# Patient Record
Sex: MALE | Race: WHITE | NOT HISPANIC OR LATINO | Employment: FULL TIME | URBAN - NONMETROPOLITAN AREA
[De-identification: names, ages, dates, MRNs, and addresses within clinical notes are randomized per-mention and may not be internally consistent; named-entity substitution may affect disease eponyms.]

---

## 2023-03-24 DIAGNOSIS — E78.2 MIXED HYPERLIPIDEMIA: ICD-10-CM

## 2023-03-24 RX ORDER — PRAVASTATIN SODIUM 40 MG/1
40 TABLET ORAL NIGHTLY
Qty: 90 TABLET | Refills: 1 | Status: SHIPPED | OUTPATIENT
Start: 2023-03-24 | End: 2023-06-05 | Stop reason: SDUPTHER

## 2023-03-24 RX ORDER — PRAVASTATIN SODIUM 40 MG/1
40 TABLET ORAL NIGHTLY
COMMUNITY
End: 2023-03-24 | Stop reason: SDUPTHER

## 2023-06-04 PROBLEM — R06.02 SHORTNESS OF BREATH ON EXERTION: Status: ACTIVE | Noted: 2023-06-04

## 2023-06-04 PROBLEM — R06.02 SHORTNESS OF BREATH ON EXERTION: Status: RESOLVED | Noted: 2023-06-04 | Resolved: 2023-06-04

## 2023-06-04 PROBLEM — E66.9 OBESITY: Status: ACTIVE | Noted: 2023-06-04

## 2023-06-04 PROBLEM — E11.9 DIABETES MELLITUS, TYPE 2 (MULTI): Status: ACTIVE | Noted: 2023-06-04

## 2023-06-04 PROBLEM — K21.9 GERD (GASTROESOPHAGEAL REFLUX DISEASE): Status: ACTIVE | Noted: 2023-06-04

## 2023-06-04 PROBLEM — I10 BENIGN ESSENTIAL HYPERTENSION: Status: ACTIVE | Noted: 2023-06-04

## 2023-06-04 PROBLEM — K76.0 FATTY LIVER: Status: ACTIVE | Noted: 2023-06-04

## 2023-06-04 NOTE — PROGRESS NOTES
Subjective   Patient ID: Mikie Louie is a 58 y.o. male who presents for Diabetes, Hypertension, and Hyperlipidemia.    HPI     The patient presents for a diabetes visit.   Last A1c: 7.0 in Dec   Medication regimen includes: farxiga, metformin, trulicity   Have you seen your eye doctor this year? Dr Piper- April 2023   Do you see a podiatrist (foot doc)? no  Peripheral neuropathy: no   Diet: pretty good   Exercise: some - needs to get back into it   Tobacco use: no  Denies chest pain, palpitations, dyspnea, edema, vision changes, confusion, weakness, polyphagia, polydipsia, polyuria, nausea, vomiting, abdominal pain, paresthesias, changes in urination     HTN- lisinopril- BP controlled      HPL- pravastatin     Hx SVT - following with cardiology- no prior ablation- was supposed to get but had to get postponed due to covid pandemic and needs to reschedule -   He then had an episode of A fib and was in ER 6 months ago- follow up with cardiology- Dr. Rodriges - reports is going to get a loop recorder placed when they do the ablation   Discussed anticoagulation with him but he wanted to hold off to see if it happens again   Now on metoprolol which seems to be suppressing the SVT     GERD- on pantoprazole 40 mg daily, pepcid now too and tums   EGD 2022  GI- Dr. Varela     Hearing trouble, getting wax out, thinks blocked       Review of Systems   Constitutional:  Negative for chills, fatigue and fever.   HENT:  Negative for congestion, ear pain and sore throat.    Eyes:  Negative for visual disturbance.   Respiratory:  Negative for cough and shortness of breath.    Cardiovascular:  Negative for chest pain, palpitations and leg swelling.   Gastrointestinal:  Negative for abdominal pain, constipation, diarrhea, nausea and vomiting.   Genitourinary: Negative.    Musculoskeletal: Negative.    Skin:  Negative for rash.   Neurological:  Negative for dizziness, weakness, numbness and headaches.   Psychiatric/Behavioral:  "Negative.         Objective   /70 (BP Location: Right arm, Patient Position: Sitting, BP Cuff Size: Large adult)   Pulse 65   Temp 35.9 °C (96.6 °F) (Temporal)   Resp 16   Ht 1.721 m (5' 7.75\")   Wt 98.7 kg (217 lb 9.6 oz)   SpO2 96%   BMI 33.33 kg/m²     Physical Exam  Constitutional: Well developed, well nourished, alert and in no acute distress.  Head and Face: Normocephalic, atraumatic.  Eyes: Normal external exam. Pupils equally round and reactive to light with normal accommodation and extraocular movements intact.   ENT: External inspection of ears normal, tympanic membranes blocked by cerumen bilaterally.  Neck: Supple, no lymphadenopathy or masses. Thyroid not enlarged, no palpable nodules.   Cardiovascular: Regular rate and rhythm, normal S1 and S2, no murmurs, gallops, or rubs. Radial pulses normal. No peripheral edema. No carotid bruits.   Pulmonary: No respiratory distress, lungs clear to auscultation bilaterally. No wheezes, rhonchi, rales.  Musculoskeletal: Gait normal. Muscle strength/tone normal. Normal range of motion of all extremities.   Skin: Warm, well perfused, normal skin turgor and color, no lesions or rashes noted.   Neurologic: Cranial nerves II-XII grossly intact.   Psychiatric: Mood calm and affect normal.      Assessment/Plan   Problem List Items Addressed This Visit          Circulatory    Benign essential hypertension     Controlled, continue lisinopril          Relevant Medications    lisinopril 5 mg tablet    Other Relevant Orders    Basic Metabolic Panel    CBC and Auto Differential    SVT (supraventricular tachycardia) (CMS/HCC)     Continue metoprolol- follows with cardiology          Relevant Medications    metoprolol tartrate (Lopressor) 25 mg tablet    Paroxysmal atrial fibrillation (CMS/HCC)     Continue metoprolol, follows with cardiology          Relevant Medications    metoprolol tartrate (Lopressor) 25 mg tablet       Digestive    GERD (gastroesophageal reflux " disease) - Primary     Continue PPI, pepcid - Dr. Varela          Relevant Medications    pantoprazole (ProtoNix) 40 mg EC tablet    famotidine (Pepcid) 20 mg tablet       Endocrine/Metabolic    Diabetes mellitus, type 2 (CMS/HCC)     A1c 6.9- controlled-  continue farxiga, metformin, trulicity          Relevant Medications    dulaglutide (Trulicity) 1.5 mg/0.5 mL pen injector    metFORMIN (Glucophage) 500 mg tablet    dapagliflozin (Farxiga) 10 mg    Other Relevant Orders    POCT glycosylated hemoglobin (Hb A1C) manually resulted (Completed)    Class 1 obesity without serious comorbidity with body mass index (BMI) of 33.0 to 33.9 in adult       Other    Mixed hyperlipidemia     Recheck fasting, continue pravastatin          Relevant Medications    pravastatin (Pravachol) 40 mg tablet    Other Relevant Orders    Lipid Panel     Other Visit Diagnoses       Bilateral impacted cerumen        Relevant Orders    Ear cerumen removal    Hearing problem of both ears        Seasonal allergic rhinitis, unspecified trigger        Relevant Medications    loratadine (Claritin) 10 mg tablet    fluticasone (Flonase) 50 mcg/actuation nasal spray          I was unable to completely remove the cerumen with use of magnification provided by an otoscope and an ear curette.  Cerumen irrigation was performed in the office with water and peroxide with success by the medical assistant.    The patient tolerated the procedure well.  Repeat examination shows that the tympanic membranes are normal without signs of infection.    Angeles Clarke, DO  6/5/2023

## 2023-06-05 ENCOUNTER — OFFICE VISIT (OUTPATIENT)
Dept: PRIMARY CARE | Facility: CLINIC | Age: 58
End: 2023-06-05
Payer: COMMERCIAL

## 2023-06-05 ENCOUNTER — LAB (OUTPATIENT)
Dept: LAB | Facility: LAB | Age: 58
End: 2023-06-05
Payer: COMMERCIAL

## 2023-06-05 VITALS
TEMPERATURE: 96.6 F | WEIGHT: 217.6 LBS | DIASTOLIC BLOOD PRESSURE: 70 MMHG | HEIGHT: 68 IN | HEART RATE: 65 BPM | OXYGEN SATURATION: 96 % | RESPIRATION RATE: 16 BRPM | BODY MASS INDEX: 32.98 KG/M2 | SYSTOLIC BLOOD PRESSURE: 108 MMHG

## 2023-06-05 DIAGNOSIS — I10 BENIGN ESSENTIAL HYPERTENSION: ICD-10-CM

## 2023-06-05 DIAGNOSIS — K21.9 GASTROESOPHAGEAL REFLUX DISEASE, UNSPECIFIED WHETHER ESOPHAGITIS PRESENT: ICD-10-CM

## 2023-06-05 DIAGNOSIS — I47.10 SVT (SUPRAVENTRICULAR TACHYCARDIA) (CMS-HCC): ICD-10-CM

## 2023-06-05 DIAGNOSIS — I48.0 PAROXYSMAL ATRIAL FIBRILLATION (MULTI): ICD-10-CM

## 2023-06-05 DIAGNOSIS — I10 BENIGN ESSENTIAL HYPERTENSION: Primary | ICD-10-CM

## 2023-06-05 DIAGNOSIS — H91.93 HEARING PROBLEM OF BOTH EARS: ICD-10-CM

## 2023-06-05 DIAGNOSIS — E78.2 MIXED HYPERLIPIDEMIA: ICD-10-CM

## 2023-06-05 DIAGNOSIS — E11.9 TYPE 2 DIABETES MELLITUS WITHOUT COMPLICATION, WITHOUT LONG-TERM CURRENT USE OF INSULIN (MULTI): ICD-10-CM

## 2023-06-05 DIAGNOSIS — H61.23 BILATERAL IMPACTED CERUMEN: ICD-10-CM

## 2023-06-05 DIAGNOSIS — E66.9 CLASS 1 OBESITY WITHOUT SERIOUS COMORBIDITY WITH BODY MASS INDEX (BMI) OF 33.0 TO 33.9 IN ADULT, UNSPECIFIED OBESITY TYPE: ICD-10-CM

## 2023-06-05 DIAGNOSIS — J30.2 SEASONAL ALLERGIC RHINITIS, UNSPECIFIED TRIGGER: ICD-10-CM

## 2023-06-05 PROBLEM — E66.813 CLASS 3 SEVERE OBESITY WITHOUT SERIOUS COMORBIDITY WITH BODY MASS INDEX (BMI) OF 40.0 TO 44.9 IN ADULT: Status: ACTIVE | Noted: 2023-06-04

## 2023-06-05 PROBLEM — E66.811 CLASS 1 OBESITY WITHOUT SERIOUS COMORBIDITY WITH BODY MASS INDEX (BMI) OF 33.0 TO 33.9 IN ADULT: Status: ACTIVE | Noted: 2023-06-04

## 2023-06-05 PROBLEM — E66.01 CLASS 3 SEVERE OBESITY WITHOUT SERIOUS COMORBIDITY WITH BODY MASS INDEX (BMI) OF 40.0 TO 44.9 IN ADULT (MULTI): Status: ACTIVE | Noted: 2023-06-04

## 2023-06-05 LAB
ANION GAP IN SER/PLAS: 13 MMOL/L (ref 10–20)
BASOPHILS (10*3/UL) IN BLOOD BY AUTOMATED COUNT: 0.04 X10E9/L (ref 0–0.1)
BASOPHILS/100 LEUKOCYTES IN BLOOD BY AUTOMATED COUNT: 0.6 % (ref 0–2)
CALCIUM (MG/DL) IN SER/PLAS: 10.6 MG/DL (ref 8.6–10.6)
CARBON DIOXIDE, TOTAL (MMOL/L) IN SER/PLAS: 29 MMOL/L (ref 21–32)
CHLORIDE (MMOL/L) IN SER/PLAS: 99 MMOL/L (ref 98–107)
CHOLESTEROL (MG/DL) IN SER/PLAS: 126 MG/DL (ref 0–199)
CHOLESTEROL IN HDL (MG/DL) IN SER/PLAS: 36 MG/DL
CHOLESTEROL/HDL RATIO: 3.5
CREATININE (MG/DL) IN SER/PLAS: 1.21 MG/DL (ref 0.5–1.3)
EOSINOPHILS (10*3/UL) IN BLOOD BY AUTOMATED COUNT: 0.2 X10E9/L (ref 0–0.7)
EOSINOPHILS/100 LEUKOCYTES IN BLOOD BY AUTOMATED COUNT: 2.8 % (ref 0–6)
ERYTHROCYTE DISTRIBUTION WIDTH (RATIO) BY AUTOMATED COUNT: 13.1 % (ref 11.5–14.5)
ERYTHROCYTE MEAN CORPUSCULAR HEMOGLOBIN CONCENTRATION (G/DL) BY AUTOMATED: 33.6 G/DL (ref 32–36)
ERYTHROCYTE MEAN CORPUSCULAR VOLUME (FL) BY AUTOMATED COUNT: 91 FL (ref 80–100)
ERYTHROCYTES (10*6/UL) IN BLOOD BY AUTOMATED COUNT: 5.45 X10E12/L (ref 4.5–5.9)
GFR MALE: 70 ML/MIN/1.73M2
GLUCOSE (MG/DL) IN SER/PLAS: 151 MG/DL (ref 74–99)
HEMATOCRIT (%) IN BLOOD BY AUTOMATED COUNT: 49.4 % (ref 41–52)
HEMOGLOBIN (G/DL) IN BLOOD: 16.6 G/DL (ref 13.5–17.5)
IMMATURE GRANULOCYTES/100 LEUKOCYTES IN BLOOD BY AUTOMATED COUNT: 0.3 % (ref 0–0.9)
LDL: 46 MG/DL (ref 0–99)
LEUKOCYTES (10*3/UL) IN BLOOD BY AUTOMATED COUNT: 7.2 X10E9/L (ref 4.4–11.3)
LYMPHOCYTES (10*3/UL) IN BLOOD BY AUTOMATED COUNT: 1.84 X10E9/L (ref 1.2–4.8)
LYMPHOCYTES/100 LEUKOCYTES IN BLOOD BY AUTOMATED COUNT: 25.6 % (ref 13–44)
MONOCYTES (10*3/UL) IN BLOOD BY AUTOMATED COUNT: 0.59 X10E9/L (ref 0.1–1)
MONOCYTES/100 LEUKOCYTES IN BLOOD BY AUTOMATED COUNT: 8.2 % (ref 2–10)
NEUTROPHILS (10*3/UL) IN BLOOD BY AUTOMATED COUNT: 4.49 X10E9/L (ref 1.2–7.7)
NEUTROPHILS/100 LEUKOCYTES IN BLOOD BY AUTOMATED COUNT: 62.5 % (ref 40–80)
NON HDL CHOLESTEROL: 90 MG/DL
NRBC (PER 100 WBCS) BY AUTOMATED COUNT: 0 /100 WBC (ref 0–0)
PLATELETS (10*3/UL) IN BLOOD AUTOMATED COUNT: 180 X10E9/L (ref 150–450)
POC HEMOGLOBIN A1C: 6.9 % (ref 4.2–6.5)
POTASSIUM (MMOL/L) IN SER/PLAS: 4.2 MMOL/L (ref 3.5–5.3)
SODIUM (MMOL/L) IN SER/PLAS: 137 MMOL/L (ref 136–145)
TRIGLYCERIDE (MG/DL) IN SER/PLAS: 222 MG/DL (ref 0–149)
UREA NITROGEN (MG/DL) IN SER/PLAS: 20 MG/DL (ref 6–23)
VLDL: 44 MG/DL (ref 0–40)

## 2023-06-05 PROCEDURE — 3008F BODY MASS INDEX DOCD: CPT | Performed by: FAMILY MEDICINE

## 2023-06-05 PROCEDURE — 99214 OFFICE O/P EST MOD 30 MIN: CPT | Performed by: FAMILY MEDICINE

## 2023-06-05 PROCEDURE — 80061 LIPID PANEL: CPT

## 2023-06-05 PROCEDURE — 3078F DIAST BP <80 MM HG: CPT | Performed by: FAMILY MEDICINE

## 2023-06-05 PROCEDURE — 36415 COLL VENOUS BLD VENIPUNCTURE: CPT

## 2023-06-05 PROCEDURE — 3074F SYST BP LT 130 MM HG: CPT | Performed by: FAMILY MEDICINE

## 2023-06-05 PROCEDURE — 69209 REMOVE IMPACTED EAR WAX UNI: CPT | Performed by: FAMILY MEDICINE

## 2023-06-05 PROCEDURE — 80048 BASIC METABOLIC PNL TOTAL CA: CPT

## 2023-06-05 PROCEDURE — 4010F ACE/ARB THERAPY RXD/TAKEN: CPT | Performed by: FAMILY MEDICINE

## 2023-06-05 PROCEDURE — 83036 HEMOGLOBIN GLYCOSYLATED A1C: CPT | Performed by: FAMILY MEDICINE

## 2023-06-05 PROCEDURE — 85025 COMPLETE CBC W/AUTO DIFF WBC: CPT

## 2023-06-05 PROCEDURE — 1036F TOBACCO NON-USER: CPT | Performed by: FAMILY MEDICINE

## 2023-06-05 RX ORDER — LISINOPRIL 5 MG/1
5 TABLET ORAL DAILY
COMMUNITY
End: 2023-06-05 | Stop reason: SDUPTHER

## 2023-06-05 RX ORDER — LORATADINE 10 MG/1
10 TABLET ORAL DAILY
Qty: 90 TABLET | Refills: 3 | Status: SHIPPED | OUTPATIENT
Start: 2023-06-05 | End: 2024-06-04

## 2023-06-05 RX ORDER — DAPAGLIFLOZIN 10 MG/1
10 TABLET, FILM COATED ORAL
Qty: 90 TABLET | Refills: 3 | Status: SHIPPED | OUTPATIENT
Start: 2023-06-05 | End: 2024-04-17 | Stop reason: SDUPTHER

## 2023-06-05 RX ORDER — METOPROLOL TARTRATE 25 MG/1
25 TABLET, FILM COATED ORAL 2 TIMES DAILY
Qty: 180 TABLET | Refills: 3 | Status: CANCELLED | OUTPATIENT
Start: 2023-06-05 | End: 2024-06-04

## 2023-06-05 RX ORDER — PANTOPRAZOLE SODIUM 20 MG/1
1 TABLET, DELAYED RELEASE ORAL DAILY
COMMUNITY
Start: 2015-07-10 | End: 2023-06-05 | Stop reason: DRUGHIGH

## 2023-06-05 RX ORDER — METFORMIN HYDROCHLORIDE 500 MG/1
1000 TABLET ORAL EVERY 12 HOURS
COMMUNITY
Start: 2015-09-28 | End: 2023-06-05 | Stop reason: SDUPTHER

## 2023-06-05 RX ORDER — METOPROLOL TARTRATE 25 MG/1
25 TABLET, FILM COATED ORAL 2 TIMES DAILY
COMMUNITY
End: 2023-07-31

## 2023-06-05 RX ORDER — DULAGLUTIDE 1.5 MG/.5ML
INJECTION, SOLUTION SUBCUTANEOUS
COMMUNITY
End: 2023-06-05 | Stop reason: SDUPTHER

## 2023-06-05 RX ORDER — LORATADINE 10 MG/1
1 TABLET ORAL DAILY
COMMUNITY
Start: 2020-06-04 | End: 2023-06-05 | Stop reason: SDUPTHER

## 2023-06-05 RX ORDER — METFORMIN HYDROCHLORIDE 500 MG/1
1000 TABLET ORAL EVERY 12 HOURS
Qty: 360 TABLET | Refills: 3 | Status: SHIPPED | OUTPATIENT
Start: 2023-06-05 | End: 2024-05-17

## 2023-06-05 RX ORDER — CHOLECALCIFEROL (VITAMIN D3) 125 MCG
5000 CAPSULE ORAL
COMMUNITY

## 2023-06-05 RX ORDER — DAPAGLIFLOZIN 10 MG/1
1 TABLET, FILM COATED ORAL
COMMUNITY
Start: 2019-10-31 | End: 2023-06-05 | Stop reason: SDUPTHER

## 2023-06-05 RX ORDER — DULAGLUTIDE 1.5 MG/.5ML
INJECTION, SOLUTION SUBCUTANEOUS
Qty: 6 ML | Refills: 3 | Status: SHIPPED | OUTPATIENT
Start: 2023-06-05 | End: 2023-10-06 | Stop reason: SDUPTHER

## 2023-06-05 RX ORDER — PRAVASTATIN SODIUM 40 MG/1
40 TABLET ORAL NIGHTLY
Qty: 90 TABLET | Refills: 3 | Status: SHIPPED | OUTPATIENT
Start: 2023-06-05 | End: 2024-06-10

## 2023-06-05 RX ORDER — LISINOPRIL 5 MG/1
5 TABLET ORAL DAILY
Qty: 90 TABLET | Refills: 3 | Status: SHIPPED | OUTPATIENT
Start: 2023-06-05 | End: 2024-05-17

## 2023-06-05 RX ORDER — FLUTICASONE PROPIONATE 50 MCG
2 SPRAY, SUSPENSION (ML) NASAL
Qty: 16 G | Refills: 3 | Status: SHIPPED | OUTPATIENT
Start: 2023-06-05 | End: 2024-06-04

## 2023-06-05 RX ORDER — PANTOPRAZOLE SODIUM 40 MG/1
40 TABLET, DELAYED RELEASE ORAL DAILY
Qty: 90 TABLET | Refills: 3 | Status: SHIPPED | OUTPATIENT
Start: 2023-06-05 | End: 2024-05-17

## 2023-06-05 RX ORDER — PANTOPRAZOLE SODIUM 20 MG/1
40 TABLET, DELAYED RELEASE ORAL DAILY
Qty: 180 TABLET | Refills: 3 | Status: SHIPPED | OUTPATIENT
Start: 2023-06-05 | End: 2023-06-05 | Stop reason: SDUPTHER

## 2023-06-05 RX ORDER — BLOOD-GLUCOSE METER
EACH MISCELLANEOUS
COMMUNITY
Start: 2020-08-24

## 2023-06-05 RX ORDER — FLUTICASONE PROPIONATE 50 MCG
2 SPRAY, SUSPENSION (ML) NASAL
COMMUNITY
Start: 2021-08-01 | End: 2023-06-05 | Stop reason: SDUPTHER

## 2023-06-05 RX ORDER — ASPIRIN 81 MG/1
TABLET ORAL EVERY 24 HOURS
COMMUNITY

## 2023-06-05 RX ORDER — FAMOTIDINE 20 MG/1
20 TABLET, FILM COATED ORAL
Qty: 90 TABLET | Refills: 3 | Status: SHIPPED | OUTPATIENT
Start: 2023-06-05 | End: 2024-05-17

## 2023-06-05 RX ORDER — FAMOTIDINE 20 MG/1
20 TABLET, FILM COATED ORAL
COMMUNITY
Start: 2022-06-06 | End: 2023-06-05 | Stop reason: SDUPTHER

## 2023-06-05 ASSESSMENT — ENCOUNTER SYMPTOMS
WEAKNESS: 0
DIARRHEA: 0
MUSCULOSKELETAL NEGATIVE: 1
DIZZINESS: 0
SHORTNESS OF BREATH: 0
NAUSEA: 0
SORE THROAT: 0
CHILLS: 0
FEVER: 0
NUMBNESS: 0
PALPITATIONS: 0
COUGH: 0
HEADACHES: 0
PSYCHIATRIC NEGATIVE: 1
ABDOMINAL PAIN: 0
FATIGUE: 0
CONSTIPATION: 0
VOMITING: 0

## 2023-06-05 ASSESSMENT — PATIENT HEALTH QUESTIONNAIRE - PHQ9
1. LITTLE INTEREST OR PLEASURE IN DOING THINGS: NOT AT ALL
2. FEELING DOWN, DEPRESSED OR HOPELESS: NOT AT ALL
SUM OF ALL RESPONSES TO PHQ9 QUESTIONS 1 AND 2: 0

## 2023-06-05 NOTE — PROGRESS NOTES
Patient ID: Mikie Louie is a 58 y.o. male.    Ear Cerumen Removal    Date/Time: 6/5/2023 9:26 AM    Performed by: Shaneka Aviles CMA  Authorized by: Angeles Clarke DO    Consent:     Consent obtained:  Verbal    Consent given by:  Patient  Universal protocol:     Patient identity confirmed:  Verbally with patient  Procedure details:     Location:  L ear and R ear    Procedure type: irrigation      Procedure outcomes: cerumen removed    Post-procedure details:     Inspection:  Ear canal clear    Procedure completion:  Tolerated

## 2023-06-07 DIAGNOSIS — E78.1 HYPERTRIGLYCERIDEMIA: Primary | ICD-10-CM

## 2023-06-07 RX ORDER — ICOSAPENT ETHYL 1 G/1
2 CAPSULE ORAL
Qty: 360 CAPSULE | Refills: 3 | Status: SHIPPED | OUTPATIENT
Start: 2023-06-07 | End: 2024-05-17

## 2023-07-29 DIAGNOSIS — I10 BENIGN ESSENTIAL HYPERTENSION: Primary | ICD-10-CM

## 2023-07-31 RX ORDER — METOPROLOL TARTRATE 25 MG/1
25 TABLET, FILM COATED ORAL 2 TIMES DAILY
Qty: 180 TABLET | Refills: 0 | Status: SHIPPED | OUTPATIENT
Start: 2023-07-31 | End: 2023-11-13

## 2023-08-10 DIAGNOSIS — E11.9 TYPE 2 DIABETES MELLITUS WITHOUT COMPLICATION, WITHOUT LONG-TERM CURRENT USE OF INSULIN (MULTI): Primary | ICD-10-CM

## 2023-08-18 ENCOUNTER — TELEMEDICINE (OUTPATIENT)
Dept: PHARMACY | Facility: HOSPITAL | Age: 58
End: 2023-08-18
Payer: COMMERCIAL

## 2023-08-18 DIAGNOSIS — E11.9 TYPE 2 DIABETES MELLITUS WITHOUT COMPLICATION, WITHOUT LONG-TERM CURRENT USE OF INSULIN (MULTI): ICD-10-CM

## 2023-08-18 NOTE — ASSESSMENT & PLAN NOTE
Patient controlled with most recent A1C of 6.9% (goal <7%).  Patient currently taking Metformin 1,00 mg BID, Farxiga 10 mg once daily, and Trulicity 1.5 mg once weekly.  Patient is tolerating medication well but did state he feels nauseous at times but believes that it is because of the foods he eats.      PLAN:   - Continue current medications (Metformin, Farxiga, Trulicity) unchanged.    - Patient stated that he does not need refills on any medications at this time     Follow up: as needed per patient request

## 2023-08-18 NOTE — PROGRESS NOTES
Subjective   Patient ID: Mikie Louie is a 58 y.o. male who presents for Diabetes.    Referring Provider: Angeles Clarke DO     Diabetes  He presents for his follow-up diabetic visit. He has type 2 diabetes mellitus. His disease course has been stable. There are no hypoglycemic associated symptoms. There are no hypoglycemic complications. He is compliant with treatment all of the time. There is no change in his home blood glucose trend. An ACE inhibitor/angiotensin II receptor blocker is being taken.       Review of Systems    Objective     There were no vitals taken for this visit.     Labs  Lab Results   Component Value Date    BILITOT 0.9 12/12/2022    CALCIUM 10.6 06/05/2023    CO2 29 06/05/2023    CL 99 06/05/2023    CREATININE 1.21 06/05/2023    GLUCOSE 151 (H) 06/05/2023    ALKPHOS 49 12/12/2022    K 4.2 06/05/2023    PROT 7.6 12/12/2022     06/05/2023    AST 26 12/12/2022    ALT 46 12/12/2022    BUN 20 06/05/2023    ANIONGAP 13 06/05/2023    ALBUMIN 4.3 12/12/2022    GFRMALE 70 06/05/2023     Lab Results   Component Value Date    TRIG 222 (H) 06/05/2023    CHOL 126 06/05/2023    HDL 36.0 (A) 06/05/2023     Lab Results   Component Value Date    HGBA1C 6.9 (A) 06/05/2023       Current Outpatient Medications on File Prior to Visit   Medication Sig Dispense Refill    aspirin 81 mg EC tablet once every 24 hours.      blood sugar diagnostic (OneTouch Verio test strips) strip TEST ONCE DAILY      cholecalciferol (Vitamin D-3) 125 MCG (5000 UT) capsule Take 1 capsule (5,000 Units) by mouth once daily.      dapagliflozin (Farxiga) 10 mg Take 1 tablet (10 mg) by mouth once daily in the morning. Take before meals. 90 tablet 3    dulaglutide (Trulicity) 1.5 mg/0.5 mL pen injector INJECT 1 PEN UNDER THE SKIN ONCE A WEEK 6 mL 3    famotidine (Pepcid) 20 mg tablet Take 1 tablet (20 mg) by mouth once daily. 90 tablet 3    fluticasone (Flonase) 50 mcg/actuation nasal spray Administer 2 sprays into each nostril  once daily. 16 g 3    icosapent ethyL (Vascepa) 1 gram capsule Take 2 capsules (2 g) by mouth 2 times a day with meals. 360 capsule 3    lisinopril 5 mg tablet Take 1 tablet (5 mg) by mouth once daily. 90 tablet 3    loratadine (Claritin) 10 mg tablet Take 1 tablet (10 mg) by mouth once daily. 90 tablet 3    metFORMIN (Glucophage) 500 mg tablet Take 2 tablets (1,000 mg) by mouth every 12 hours. 360 tablet 3    metoprolol tartrate (Lopressor) 25 mg tablet TAKE ONE TABLET BY MOUTH TWO TIMES A  tablet 0    pantoprazole (ProtoNix) 40 mg EC tablet Take 1 tablet (40 mg) by mouth once daily. 90 tablet 3    pravastatin (Pravachol) 40 mg tablet Take 1 tablet (40 mg) by mouth once daily at bedtime. 90 tablet 3     No current facility-administered medications on file prior to visit.        Assessment/Plan   Problem List Items Addressed This Visit       Diabetes mellitus, type 2 (CMS/[facility])     Patient controlled with most recent A1C of 6.9% (goal <7%).  Patient currently taking Metformin 1,00 mg BID, Farxiga 10 mg once daily, and Trulicity 1.5 mg once weekly.  Patient is tolerating medication well but did state he feels nauseous at times but believes that it is because of the foods he eats.      PLAN:   - Continue current medications (Metformin, Farxiga, Trulicity) unchanged.    - Patient stated that he does not need refills on any medications at this time     Follow up: as needed per patient request             Bessie Escoto, PharmD    Continue all meds under the continuation of care with the referring provider and clinical pharmacy team.

## 2023-10-06 DIAGNOSIS — E11.9 TYPE 2 DIABETES MELLITUS WITHOUT COMPLICATION, WITHOUT LONG-TERM CURRENT USE OF INSULIN (MULTI): ICD-10-CM

## 2023-10-07 RX ORDER — DULAGLUTIDE 1.5 MG/.5ML
INJECTION, SOLUTION SUBCUTANEOUS
Qty: 6 ML | Refills: 1 | Status: SHIPPED | OUTPATIENT
Start: 2023-10-07 | End: 2023-10-19 | Stop reason: ALTCHOICE

## 2023-10-10 ENCOUNTER — TELEPHONE (OUTPATIENT)
Dept: PRIMARY CARE | Facility: CLINIC | Age: 58
End: 2023-10-10
Payer: COMMERCIAL

## 2023-10-17 DIAGNOSIS — E11.9 TYPE 2 DIABETES MELLITUS WITHOUT COMPLICATION, WITHOUT LONG-TERM CURRENT USE OF INSULIN (MULTI): Primary | ICD-10-CM

## 2023-10-17 PROBLEM — K31.7 GASTRIC POLYP: Status: ACTIVE | Noted: 2023-10-17

## 2023-10-17 PROBLEM — Z86.010 HISTORY OF COLON POLYPS: Status: ACTIVE | Noted: 2023-10-17

## 2023-10-17 PROBLEM — L98.9 SCALP LESION: Status: ACTIVE | Noted: 2023-10-17

## 2023-10-17 PROBLEM — K20.90 ESOPHAGITIS: Status: ACTIVE | Noted: 2023-10-17

## 2023-10-17 PROBLEM — Z86.0100 HISTORY OF COLON POLYPS: Status: ACTIVE | Noted: 2023-10-17

## 2023-10-17 PROBLEM — E55.9 VITAMIN D DEFICIENCY: Status: ACTIVE | Noted: 2023-10-17

## 2023-10-17 PROBLEM — R80.9 PROTEINURIA: Status: ACTIVE | Noted: 2023-10-17

## 2023-10-17 PROBLEM — E78.1 HYPERTRIGLYCERIDEMIA: Status: ACTIVE | Noted: 2023-10-17

## 2023-10-17 PROBLEM — Z86.79 S/P CATHETER ABLATION OF SLOW PATHWAY: Status: ACTIVE | Noted: 2023-07-10

## 2023-10-17 PROBLEM — Z98.890 S/P CATHETER ABLATION OF SLOW PATHWAY: Status: ACTIVE | Noted: 2023-07-10

## 2023-10-17 PROBLEM — Z95.818 STATUS POST PLACEMENT OF IMPLANTABLE LOOP RECORDER: Status: ACTIVE | Noted: 2023-07-10

## 2023-10-17 RX ORDER — OMEGA-3-ACID ETHYL ESTERS 1 G/1
2 CAPSULE, LIQUID FILLED ORAL 2 TIMES DAILY
COMMUNITY

## 2023-10-17 RX ORDER — DULAGLUTIDE 1.5 MG/.5ML
1.5 INJECTION, SOLUTION SUBCUTANEOUS
COMMUNITY
Start: 2022-10-28 | End: 2023-10-19 | Stop reason: ALTCHOICE

## 2023-10-17 RX ORDER — NYSTATIN AND TRIAMCINOLONE ACETONIDE 100000; 1 [USP'U]/G; MG/G
OINTMENT TOPICAL
COMMUNITY
Start: 2023-08-24 | End: 2023-12-07 | Stop reason: ALTCHOICE

## 2023-10-17 RX ORDER — LISINOPRIL 10 MG/1
10 TABLET ORAL DAILY
COMMUNITY
End: 2023-12-07 | Stop reason: ALTCHOICE

## 2023-10-17 RX ORDER — DAPAGLIFLOZIN 5 MG/1
5 TABLET, FILM COATED ORAL DAILY
COMMUNITY
End: 2023-11-02 | Stop reason: ALTCHOICE

## 2023-10-17 RX ORDER — LANCETS
EACH MISCELLANEOUS
COMMUNITY

## 2023-10-17 RX ORDER — MOMETASONE FUROATE 50 UG/1
2 SPRAY, METERED NASAL DAILY
COMMUNITY
Start: 2010-02-12

## 2023-10-17 RX ORDER — GLIPIZIDE 10 MG/1
10 TABLET, FILM COATED, EXTENDED RELEASE ORAL DAILY PRN
COMMUNITY
End: 2023-12-07 | Stop reason: ALTCHOICE

## 2023-10-17 RX ORDER — METOPROLOL SUCCINATE 25 MG/1
0.5 TABLET, EXTENDED RELEASE ORAL DAILY
COMMUNITY
End: 2023-12-07 | Stop reason: ALTCHOICE

## 2023-10-19 ENCOUNTER — TELEMEDICINE (OUTPATIENT)
Dept: PHARMACY | Facility: HOSPITAL | Age: 58
End: 2023-10-19
Payer: COMMERCIAL

## 2023-10-19 ENCOUNTER — PHARMACY VISIT (OUTPATIENT)
Dept: PHARMACY | Facility: CLINIC | Age: 58
End: 2023-10-19
Payer: MEDICARE

## 2023-10-19 DIAGNOSIS — E11.9 TYPE 2 DIABETES MELLITUS WITHOUT COMPLICATION, WITHOUT LONG-TERM CURRENT USE OF INSULIN (MULTI): ICD-10-CM

## 2023-10-19 PROCEDURE — RXMED WILLOW AMBULATORY MEDICATION CHARGE

## 2023-10-19 RX ORDER — DULAGLUTIDE 0.75 MG/.5ML
0.75 INJECTION, SOLUTION SUBCUTANEOUS
Qty: 2 ML | Refills: 2 | Status: SHIPPED | OUTPATIENT
Start: 2023-10-19 | End: 2023-11-02 | Stop reason: ALTCHOICE

## 2023-10-19 NOTE — PROGRESS NOTES
Subjective   Patient ID: Mikie Louie is a 58 y.o. male who presents for Diabetes.    Referring Provider: Angeles Clarke DO     Diabetes  He presents for his follow-up diabetic visit. He has type 2 diabetes mellitus. His disease course has been stable. There are no hypoglycemic associated symptoms. There are no hypoglycemic complications. He is compliant with treatment all of the time. There is no change in his home blood glucose trend. An ACE inhibitor/angiotensin II receptor blocker is being taken.       Review of Systems    Objective     There were no vitals taken for this visit.     Labs  Lab Results   Component Value Date    BILITOT 0.9 12/12/2022    CALCIUM 10.6 06/05/2023    CO2 29 06/05/2023    CL 99 06/05/2023    CREATININE 1.21 06/05/2023    GLUCOSE 151 (H) 06/05/2023    ALKPHOS 49 12/12/2022    K 4.2 06/05/2023    PROT 7.6 12/12/2022     06/05/2023    AST 26 12/12/2022    ALT 46 12/12/2022    BUN 20 06/05/2023    ANIONGAP 13 06/05/2023    ALBUMIN 4.3 12/12/2022    GFRMALE 70 06/05/2023     Lab Results   Component Value Date    TRIG 222 (H) 06/05/2023    CHOL 126 06/05/2023    HDL 36.0 (A) 06/05/2023     Lab Results   Component Value Date    HGBA1C 6.9 (A) 06/05/2023       Current Outpatient Medications on File Prior to Visit   Medication Sig Dispense Refill    aspirin 81 mg EC tablet once every 24 hours.      blood sugar diagnostic (OneTouch Verio test strips) strip TEST ONCE DAILY      cholecalciferol (Vitamin D-3) 125 MCG (5000 UT) capsule Take 1 capsule (5,000 Units) by mouth once daily.      dapagliflozin (Farxiga) 10 mg Take 1 tablet (10 mg) by mouth once daily in the morning. Take before meals. 90 tablet 3    dapagliflozin propanediol (Farxiga) 5 mg Take 1 tablet (5 mg) by mouth once daily.      famotidine (Pepcid) 20 mg tablet Take 1 tablet (20 mg) by mouth once daily. 90 tablet 3    fluticasone (Flonase) 50 mcg/actuation nasal spray Administer 2 sprays into each nostril once daily.  16 g 3    glipiZIDE XL (Glucotrol XL) 10 mg 24 hr tablet Take 1 tablet (10 mg) by mouth once daily as needed.      icosapent ethyL (Vascepa) 1 gram capsule Take 2 capsules (2 g) by mouth 2 times a day with meals. 360 capsule 3    lancets misc One touch ultra soft lances      lisinopril 10 mg tablet Take 1 tablet (10 mg) by mouth once daily.      lisinopril 5 mg tablet Take 1 tablet (5 mg) by mouth once daily. 90 tablet 3    loratadine (Claritin) 10 mg tablet Take 1 tablet (10 mg) by mouth once daily. 90 tablet 3    metFORMIN (Glucophage) 500 mg tablet Take 2 tablets (1,000 mg) by mouth every 12 hours. 360 tablet 3    metoprolol succinate XL (Toprol XL) 25 mg 24 hr tablet Take 0.5 tablets (12.5 mg) by mouth once daily.      metoprolol tartrate (Lopressor) 25 mg tablet TAKE ONE TABLET BY MOUTH TWO TIMES A  tablet 0    mometasone (Nasonex) 50 mcg/actuation nasal spray Administer 2 sprays into each nostril once daily.      nystatin-triamcinolone (Mycolog II) ointment Apply topically. Apply 2-4 times per day      omega-3 acid ethyl esters (Lovaza) 1 gram capsule Take 2 capsules (2 g) by mouth 2 times a day.      pantoprazole (ProtoNix) 40 mg EC tablet Take 1 tablet (40 mg) by mouth once daily. 90 tablet 3    pravastatin (Pravachol) 40 mg tablet Take 1 tablet (40 mg) by mouth once daily at bedtime. 90 tablet 3    RANITIDINE HCL ORAL Take 300 mg by mouth once daily.      [DISCONTINUED] dulaglutide (Trulicity) 1.5 mg/0.5 mL pen injector injection INJECT 1 PEN UNDER THE SKIN ONCE A WEEK 6 mL 1    [DISCONTINUED] dulaglutide (Trulicity) 1.5 mg/0.5 mL pen injector injection Inject 1.5 mg under the skin 1 (one) time per week.       No current facility-administered medications on file prior to visit.        Assessment/Plan   Problem List Items Addressed This Visit       Diabetes mellitus, type 2 (CMS/HCC)     Patient controlled with most recent A1C of 6.9% (goal <7%).  Patient currently taking Metformin 1,00 mg BID,  Farxiga 10 mg once daily, and Trulicity 1.5 mg once weekly.  Patient is tolerating medication well but did state he feels nauseous at times but believes that it is because of the foods he eats.      Patient currently switched jobs and will be getting new insurance.  Patient states that there was an issue with his Trulicity and has not had the medication for 3 weeks now.  The patient's new insurance became active earlier than expected (effective as of 10-1-23) and now requires a PA for all GLP-1 medications. Patient is contacting work/insurance to see if able to use new insurance for current fills.      PLAN:   - Continue current medications (Metformin and Farxiga) unchanged.    - RESTART Trulicity 0.75 mg once weekly injections.  Prescription sent to [facility] to be mailed to patient.    - [facility] to work on PA approval for Trulicity for continuation of therapy.      Follow up: 2 weeks             Bessie Escoto, PharmD    Continue all meds under the continuation of care with the referring provider and clinical pharmacy team.

## 2023-10-19 NOTE — ASSESSMENT & PLAN NOTE
Patient controlled with most recent A1C of 6.9% (goal <7%).  Patient currently taking Metformin 1,00 mg BID, Farxiga 10 mg once daily, and Trulicity 1.5 mg once weekly.  Patient is tolerating medication well but did state he feels nauseous at times but believes that it is because of the foods he eats.      Patient currently switched jobs and will be getting new insurance.  Patient states that there was an issue with his Trulicity and has not had the medication for 3 weeks now.  The patient's new insurance became active earlier than expected (effective as of 10-1-23) and now requires a PA for all GLP-1 medications. Patient is contacting work/insurance to see if able to use new insurance for current fills.      PLAN:   - Continue current medications (Metformin and Farxiga) unchanged.    - RESTART Trulicity 0.75 mg once weekly injections.  Prescription sent to [facility] to be mailed to patient.    - [facility] to work on PA approval for Trulicity for continuation of therapy.      Follow up: 2 weeks

## 2023-11-02 ENCOUNTER — TELEMEDICINE (OUTPATIENT)
Dept: PHARMACY | Facility: HOSPITAL | Age: 58
End: 2023-11-02
Payer: COMMERCIAL

## 2023-11-02 DIAGNOSIS — E11.9 TYPE 2 DIABETES MELLITUS WITHOUT COMPLICATION, WITHOUT LONG-TERM CURRENT USE OF INSULIN (MULTI): ICD-10-CM

## 2023-11-02 RX ORDER — DULAGLUTIDE 1.5 MG/.5ML
1.5 INJECTION, SOLUTION SUBCUTANEOUS
Qty: 6 ML | Refills: 3 | Status: SHIPPED | OUTPATIENT
Start: 2023-11-02 | End: 2024-04-17 | Stop reason: SDUPTHER

## 2023-11-02 NOTE — PROGRESS NOTES
Subjective   Patient ID: Mikie Louie is a 58 y.o. male who presents for Diabetes.    Referring Provider: Angeles Clarke DO     Diabetes  He presents for his follow-up diabetic visit. He has type 2 diabetes mellitus. His disease course has been stable. There are no hypoglycemic associated symptoms. There are no hypoglycemic complications. He is compliant with treatment all of the time. There is no change in his home blood glucose trend. An ACE inhibitor/angiotensin II receptor blocker is being taken.       Review of Systems    Objective     There were no vitals taken for this visit.     Labs  Lab Results   Component Value Date    BILITOT 0.9 12/12/2022    CALCIUM 10.6 06/05/2023    CO2 29 06/05/2023    CL 99 06/05/2023    CREATININE 1.21 06/05/2023    GLUCOSE 151 (H) 06/05/2023    ALKPHOS 49 12/12/2022    K 4.2 06/05/2023    PROT 7.6 12/12/2022     06/05/2023    AST 26 12/12/2022    ALT 46 12/12/2022    BUN 20 06/05/2023    ANIONGAP 13 06/05/2023    ALBUMIN 4.3 12/12/2022    GFRMALE 70 06/05/2023     Lab Results   Component Value Date    TRIG 222 (H) 06/05/2023    CHOL 126 06/05/2023    HDL 36.0 (A) 06/05/2023     Lab Results   Component Value Date    HGBA1C 6.9 (A) 06/05/2023       Current Outpatient Medications on File Prior to Visit   Medication Sig Dispense Refill    aspirin 81 mg EC tablet once every 24 hours.      blood sugar diagnostic (OneTouch Verio test strips) strip TEST ONCE DAILY      cholecalciferol (Vitamin D-3) 125 MCG (5000 UT) capsule Take 1 capsule (5,000 Units) by mouth once daily.      dapagliflozin (Farxiga) 10 mg Take 1 tablet (10 mg) by mouth once daily in the morning. Take before meals. 90 tablet 3    famotidine (Pepcid) 20 mg tablet Take 1 tablet (20 mg) by mouth once daily. 90 tablet 3    fluticasone (Flonase) 50 mcg/actuation nasal spray Administer 2 sprays into each nostril once daily. 16 g 3    glipiZIDE XL (Glucotrol XL) 10 mg 24 hr tablet Take 1 tablet (10 mg) by mouth  once daily as needed.      icosapent ethyL (Vascepa) 1 gram capsule Take 2 capsules (2 g) by mouth 2 times a day with meals. 360 capsule 3    lancets misc One touch ultra soft lances      lisinopril 10 mg tablet Take 1 tablet (10 mg) by mouth once daily.      lisinopril 5 mg tablet Take 1 tablet (5 mg) by mouth once daily. 90 tablet 3    loratadine (Claritin) 10 mg tablet Take 1 tablet (10 mg) by mouth once daily. 90 tablet 3    metFORMIN (Glucophage) 500 mg tablet Take 2 tablets (1,000 mg) by mouth every 12 hours. 360 tablet 3    metoprolol succinate XL (Toprol XL) 25 mg 24 hr tablet Take 0.5 tablets (12.5 mg) by mouth once daily.      metoprolol tartrate (Lopressor) 25 mg tablet TAKE ONE TABLET BY MOUTH TWO TIMES A  tablet 0    mometasone (Nasonex) 50 mcg/actuation nasal spray Administer 2 sprays into each nostril once daily.      nystatin-triamcinolone (Mycolog II) ointment Apply topically. Apply 2-4 times per day      omega-3 acid ethyl esters (Lovaza) 1 gram capsule Take 2 capsules (2 g) by mouth 2 times a day.      pantoprazole (ProtoNix) 40 mg EC tablet Take 1 tablet (40 mg) by mouth once daily. 90 tablet 3    pravastatin (Pravachol) 40 mg tablet Take 1 tablet (40 mg) by mouth once daily at bedtime. 90 tablet 3    RANITIDINE HCL ORAL Take 300 mg by mouth once daily.      [DISCONTINUED] dapagliflozin propanediol (Farxiga) 5 mg Take 1 tablet (5 mg) by mouth once daily.      [DISCONTINUED] dulaglutide (Trulicity) 0.75 mg/0.5 mL pen injector Inject 0.75 mg under the skin 1 (one) time per week. 2 mL 2     No current facility-administered medications on file prior to visit.        Assessment/Plan   Problem List Items Addressed This Visit       Diabetes mellitus, type 2 (CMS/[facility])     Patient controlled with most recent A1C of 6.9% (goal <7%).  Patient currently taking Metformin 1,00 mg BID, Farxiga 10 mg once daily, and Trulicity 1.5 mg once weekly.  Patient is tolerating medication well but did state he  feels nauseous at times but believes that it is because of the foods he eats.      Patient currently switched jobs and will be getting new insurance.  Patient states that there was an issue with his Trulicity and has not had the medication for 3 weeks now.  The patient's new insurance became active earlier than expected (effective as of 10-1-23) and now requires a PA for all GLP-1 medications. Patient is contacting work/insurance to see if able to use new insurance for current fills.      PLAN:   - Continue current medications (Metformin and Farxiga) unchanged.    - INCREASE to Trulicity 1.5 mg once weekly injections.  Prescription sent to [employer] to be mailed to patient.    - PA for Trulicity for new insurance approved when coverage starts in January.       Follow up: as needed per patient request            Bessie Escoto, PharmD    Continue all meds under the continuation of care with the referring provider and clinical pharmacy team.

## 2023-11-02 NOTE — ASSESSMENT & PLAN NOTE
Patient controlled with most recent A1C of 6.9% (goal <7%).  Patient currently taking Metformin 1,00 mg BID, Farxiga 10 mg once daily, and Trulicity 1.5 mg once weekly.  Patient is tolerating medication well but did state he feels nauseous at times but believes that it is because of the foods he eats.      Patient currently switched jobs and will be getting new insurance.  Patient states that there was an issue with his Trulicity and has not had the medication for 3 weeks now.  The patient's new insurance became active earlier than expected (effective as of 10-1-23) and now requires a PA for all GLP-1 medications. Patient is contacting work/insurance to see if able to use new insurance for current fills.      PLAN:   - Continue current medications (Metformin and Farxiga) unchanged.    - INCREASE to Trulicity 1.5 mg once weekly injections.  Prescription sent to [email or web address] to be mailed to patient.    - PA for Trulicity for new insurance approved when coverage starts in January.       Follow up: as needed per patient request

## 2023-11-13 DIAGNOSIS — I10 BENIGN ESSENTIAL HYPERTENSION: ICD-10-CM

## 2023-11-13 RX ORDER — METOPROLOL TARTRATE 25 MG/1
25 TABLET, FILM COATED ORAL 2 TIMES DAILY
Qty: 180 TABLET | Refills: 1 | Status: SHIPPED | OUTPATIENT
Start: 2023-11-13 | End: 2023-12-07 | Stop reason: ALTCHOICE

## 2023-12-06 NOTE — PROGRESS NOTES
Subjective   Patient ID: Mikie Louie is a 58 y.o. male who presents for Follow-up, Hypertension, GERD, Hyperlipidemia, Diabetes, and Immunizations.    HPI     The patient is here today for a follow up of chronic medical conditions and a physical.  Hx of colonoscopy: 12/8/22 polyps - repeat 5 years  Immunizations: utd   History of depression or anxiety: no     The patient presents for a diabetes visit.   Last A1c: 6.9 in June   Medication regimen includes: farxiga, metformin, trulicity   Have you seen your eye doctor this year? Dr Piper- April 2023   Do you see a podiatrist (foot doc)? no  Peripheral neuropathy: no   Diet: pretty good   Exercise: some - needs to get back into it   Tobacco use: no  Denies chest pain, palpitations, dyspnea, edema, vision changes, confusion, weakness, polyphagia, polydipsia, polyuria, nausea, vomiting, abdominal pain, paresthesias, changes in urination     HTN- lisinopril- BP controlled      HPL- pravastatin, lovaza      Hx SVT - following with cardiology-    He had an episode of A fib and was in ER- saw Dr. 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 Varela        Current Outpatient Medications   Medication Sig Dispense Refill    aspirin 81 mg EC tablet once every 24 hours.      blood sugar diagnostic (OneTouch Verio test strips) strip TEST ONCE DAILY      cholecalciferol (Vitamin D-3) 125 MCG (5000 UT) capsule Take 1 capsule (125 mcg) by mouth once daily.      dapagliflozin (Farxiga) 10 mg Take 1 tablet (10 mg) by mouth  once daily in the morning. Take before meals. 90 tablet 3    dulaglutide (Trulicity) 1.5 mg/0.5 mL pen injector injection Inject 1.5 mg under the skin 1 (one) time per week. 6 mL 3    famotidine (Pepcid) 20 mg tablet Take 1 tablet (20 mg) by mouth once daily. 90 tablet 3    fluticasone (Flonase) 50 mcg/actuation nasal spray Administer 2 sprays into each nostril once daily. 16 g 3    icosapent ethyL (Vascepa) 1 gram capsule Take 2 capsules (2 g) by mouth 2 times a day with meals. 360 capsule 3    lancets misc One touch ultra soft lances      lisinopril 5 mg tablet Take 1 tablet (5 mg) by mouth once daily. 90 tablet 3    loratadine (Claritin) 10 mg tablet Take 1 tablet (10 mg) by mouth once daily. 90 tablet 3    metFORMIN (Glucophage) 500 mg tablet Take 2 tablets (1,000 mg) by mouth every 12 hours. 360 tablet 3    metoprolol tartrate (Lopressor) 25 mg tablet Take 1 tablet (25 mg) by mouth 2 times a day.      mometasone (Nasonex) 50 mcg/actuation nasal spray Administer 2 sprays into each nostril once daily.      omega-3 acid ethyl esters (Lovaza) 1 gram capsule Take 2 capsules (2 g) by mouth 2 times a day.      pantoprazole (ProtoNix) 40 mg EC tablet Take 1 tablet (40 mg) by mouth once daily. 90 tablet 3    pravastatin (Pravachol) 40 mg tablet Take 1 tablet (40 mg) by mouth once daily at bedtime. 90 tablet 3    RANITIDINE HCL ORAL Take 300 mg by mouth once daily.       No current facility-administered medications for this visit.       Review of Systems   Constitutional:  Negative for chills, fatigue and fever.   HENT:  Negative for congestion, ear pain and sore throat.    Eyes:  Negative for visual disturbance.   Respiratory:  Negative for cough and shortness of breath.    Cardiovascular:  Negative for chest pain, palpitations and leg swelling.   Gastrointestinal:  Negative for abdominal pain, constipation, diarrhea, nausea and vomiting.   Genitourinary: Negative.    Musculoskeletal: Negative.    Skin:  Negative for  "rash.   Neurological:  Negative for dizziness, weakness, numbness and headaches.   Psychiatric/Behavioral: Negative.                 Objective   /79 (BP Location: Left arm, Patient Position: Sitting, BP Cuff Size: Adult)   Pulse 65   Temp 36.6 °C (97.9 °F) (Temporal)   Resp 16   Ht 1.715 m (5' 7.5\")   Wt 98.7 kg (217 lb 8 oz)   SpO2 98%   BMI 33.56 kg/m²     Physical Exam    Constitutional: Well developed, well nourished, alert and in no acute distress.  Head and Face: Normocephalic, atraumatic.  Eyes: Normal external exam. Pupils equally round and reactive to light with normal accommodation and extraocular movements intact.   ENT: External inspection of ears normal, tympanic membranes visualized and normal. Nasal mucosa, septum, and turbinates normal. Oral mucosa moist, oropharynx clear.   Neck: Supple, no lymphadenopathy or masses. Thyroid not enlarged, no palpable nodules.   Cardiovascular: Regular rate and rhythm, normal S1 and S2, no murmurs, gallops, or rubs. Radial pulses normal. No peripheral edema. No carotid bruits.   Pulmonary: No respiratory distress, lungs clear to auscultation bilaterally. No wheezes, rhonchi, rales.  Abdomen: Soft, nontender, nondistended, normal bowel sounds. No masses palpated.   Musculoskeletal: Gait normal. Muscle strength/tone normal. Normal range of motion of all extremities.   Skin: Warm, well perfused, normal skin turgor and color, no lesions or rashes noted.   Neurologic: Cranial nerves II-XII grossly intact.   Psychiatric: Mood calm and affect normal.      Assessment/Plan   Problem List Items Addressed This Visit             ICD-10-CM    Mixed hyperlipidemia E78.2     Continue pravastatin, lovaza          Benign essential hypertension I10     Controlled, continue lisinopril, metoprolol          Relevant Orders    Follow Up In [facility] - PCP - Established    Diabetes mellitus, type 2 (CMS/HCC) E11.9     Controlled, continue farxiga, metformin, and " trulicity          Relevant Orders    Hemoglobin A1C    Comprehensive Metabolic Panel    Albumin , Urine Random    GERD (gastroesophageal reflux disease) K21.9     Continue PPI- follows with Dr. Varela          Class 1 obesity without serious comorbidity with body mass index (BMI) of 33.0 to 33.9 in adult E66.9, Z68.33    SVT (supraventricular tachycardia) I47.10     Continue metoprolol, follows with Dr. Rodriges          Relevant Medications    metoprolol tartrate (Lopressor) 25 mg tablet    Paroxysmal atrial fibrillation (CMS/HCC) I48.0    Relevant Medications    metoprolol tartrate (Lopressor) 25 mg tablet    Status post placement of implantable loop recorder Z95.818    S/P catheter ablation of slow pathway Z98.890, Z86.79     Other Visit Diagnoses         Codes    Annual physical exam    -  Primary Z00.00    Screening for malignant neoplasm of prostate     Z12.5    Relevant Orders    Prostate Spec.Ag,Screen          Follow up 6 months     Angeles Clarke,   12/7/2023

## 2023-12-07 ENCOUNTER — OFFICE VISIT (OUTPATIENT)
Dept: PRIMARY CARE | Facility: CLINIC | Age: 58
End: 2023-12-07
Payer: COMMERCIAL

## 2023-12-07 ENCOUNTER — LAB (OUTPATIENT)
Dept: LAB | Facility: LAB | Age: 58
End: 2023-12-07
Payer: COMMERCIAL

## 2023-12-07 VITALS
SYSTOLIC BLOOD PRESSURE: 120 MMHG | BODY MASS INDEX: 32.96 KG/M2 | WEIGHT: 217.5 LBS | HEART RATE: 65 BPM | DIASTOLIC BLOOD PRESSURE: 79 MMHG | TEMPERATURE: 97.9 F | OXYGEN SATURATION: 98 % | RESPIRATION RATE: 16 BRPM | HEIGHT: 68 IN

## 2023-12-07 DIAGNOSIS — K21.9 GASTROESOPHAGEAL REFLUX DISEASE, UNSPECIFIED WHETHER ESOPHAGITIS PRESENT: ICD-10-CM

## 2023-12-07 DIAGNOSIS — E11.9 TYPE 2 DIABETES MELLITUS WITHOUT COMPLICATION, WITHOUT LONG-TERM CURRENT USE OF INSULIN (MULTI): ICD-10-CM

## 2023-12-07 DIAGNOSIS — E78.2 MIXED HYPERLIPIDEMIA: ICD-10-CM

## 2023-12-07 DIAGNOSIS — Z12.5 SCREENING FOR MALIGNANT NEOPLASM OF PROSTATE: ICD-10-CM

## 2023-12-07 DIAGNOSIS — Z95.818 STATUS POST PLACEMENT OF IMPLANTABLE LOOP RECORDER: ICD-10-CM

## 2023-12-07 DIAGNOSIS — I48.0 PAROXYSMAL ATRIAL FIBRILLATION (MULTI): ICD-10-CM

## 2023-12-07 DIAGNOSIS — Z00.00 ANNUAL PHYSICAL EXAM: Primary | ICD-10-CM

## 2023-12-07 DIAGNOSIS — E66.9 CLASS 1 OBESITY WITHOUT SERIOUS COMORBIDITY WITH BODY MASS INDEX (BMI) OF 33.0 TO 33.9 IN ADULT, UNSPECIFIED OBESITY TYPE: ICD-10-CM

## 2023-12-07 DIAGNOSIS — I10 BENIGN ESSENTIAL HYPERTENSION: ICD-10-CM

## 2023-12-07 DIAGNOSIS — Z98.890 S/P CATHETER ABLATION OF SLOW PATHWAY: ICD-10-CM

## 2023-12-07 DIAGNOSIS — I47.10 SVT (SUPRAVENTRICULAR TACHYCARDIA) (CMS-HCC): ICD-10-CM

## 2023-12-07 DIAGNOSIS — Z86.79 S/P CATHETER ABLATION OF SLOW PATHWAY: ICD-10-CM

## 2023-12-07 PROBLEM — E55.9 VITAMIN D DEFICIENCY: Status: RESOLVED | Noted: 2023-10-17 | Resolved: 2023-12-07

## 2023-12-07 PROCEDURE — 80053 COMPREHEN METABOLIC PANEL: CPT

## 2023-12-07 PROCEDURE — 99214 OFFICE O/P EST MOD 30 MIN: CPT | Performed by: FAMILY MEDICINE

## 2023-12-07 PROCEDURE — 3074F SYST BP LT 130 MM HG: CPT | Performed by: FAMILY MEDICINE

## 2023-12-07 PROCEDURE — 3078F DIAST BP <80 MM HG: CPT | Performed by: FAMILY MEDICINE

## 2023-12-07 PROCEDURE — 90471 IMMUNIZATION ADMIN: CPT | Performed by: FAMILY MEDICINE

## 2023-12-07 PROCEDURE — 99396 PREV VISIT EST AGE 40-64: CPT | Performed by: FAMILY MEDICINE

## 2023-12-07 PROCEDURE — 1036F TOBACCO NON-USER: CPT | Performed by: FAMILY MEDICINE

## 2023-12-07 PROCEDURE — 3008F BODY MASS INDEX DOCD: CPT | Performed by: FAMILY MEDICINE

## 2023-12-07 PROCEDURE — 83036 HEMOGLOBIN GLYCOSYLATED A1C: CPT

## 2023-12-07 PROCEDURE — 90686 IIV4 VACC NO PRSV 0.5 ML IM: CPT | Performed by: FAMILY MEDICINE

## 2023-12-07 PROCEDURE — 84153 ASSAY OF PSA TOTAL: CPT

## 2023-12-07 PROCEDURE — 36415 COLL VENOUS BLD VENIPUNCTURE: CPT

## 2023-12-07 PROCEDURE — 82570 ASSAY OF URINE CREATININE: CPT

## 2023-12-07 PROCEDURE — 82043 UR ALBUMIN QUANTITATIVE: CPT

## 2023-12-07 PROCEDURE — 4010F ACE/ARB THERAPY RXD/TAKEN: CPT | Performed by: FAMILY MEDICINE

## 2023-12-07 RX ORDER — METOPROLOL TARTRATE 25 MG/1
25 TABLET, FILM COATED ORAL 2 TIMES DAILY
COMMUNITY
End: 2024-05-28 | Stop reason: SDUPTHER

## 2023-12-07 ASSESSMENT — ENCOUNTER SYMPTOMS
VOMITING: 0
CHILLS: 0
MUSCULOSKELETAL NEGATIVE: 1
FEVER: 0
HEADACHES: 0
FATIGUE: 0
SORE THROAT: 0
PALPITATIONS: 0
CONSTIPATION: 0
WEAKNESS: 0
NUMBNESS: 0
DIZZINESS: 0
COUGH: 0
NAUSEA: 0
SHORTNESS OF BREATH: 0
ABDOMINAL PAIN: 0
PSYCHIATRIC NEGATIVE: 1
DIARRHEA: 0

## 2023-12-08 LAB
ALBUMIN SERPL BCP-MCNC: 4.5 G/DL (ref 3.4–5)
ALP SERPL-CCNC: 37 U/L (ref 33–120)
ALT SERPL W P-5'-P-CCNC: 48 U/L (ref 10–52)
ANION GAP SERPL CALC-SCNC: 18 MMOL/L (ref 10–20)
AST SERPL W P-5'-P-CCNC: 30 U/L (ref 9–39)
BILIRUB SERPL-MCNC: 0.8 MG/DL (ref 0–1.2)
BUN SERPL-MCNC: 22 MG/DL (ref 6–23)
CALCIUM SERPL-MCNC: 10.2 MG/DL (ref 8.6–10.6)
CHLORIDE SERPL-SCNC: 100 MMOL/L (ref 98–107)
CO2 SERPL-SCNC: 26 MMOL/L (ref 21–32)
CREAT SERPL-MCNC: 1.14 MG/DL (ref 0.5–1.3)
CREAT UR-MCNC: 77.5 MG/DL (ref 20–370)
EST. AVERAGE GLUCOSE BLD GHB EST-MCNC: 169 MG/DL
GFR SERPL CREATININE-BSD FRML MDRD: 75 ML/MIN/1.73M*2
GLUCOSE SERPL-MCNC: 163 MG/DL (ref 74–99)
HBA1C MFR BLD: 7.5 %
MICROALBUMIN UR-MCNC: <7 MG/L
MICROALBUMIN/CREAT UR: NORMAL MG/G{CREAT}
POTASSIUM SERPL-SCNC: 4.3 MMOL/L (ref 3.5–5.3)
PROT SERPL-MCNC: 7.9 G/DL (ref 6.4–8.2)
PSA SERPL-MCNC: 2.31 NG/ML
SODIUM SERPL-SCNC: 140 MMOL/L (ref 136–145)

## 2024-04-17 DIAGNOSIS — E11.9 TYPE 2 DIABETES MELLITUS WITHOUT COMPLICATION, WITHOUT LONG-TERM CURRENT USE OF INSULIN (MULTI): ICD-10-CM

## 2024-04-17 RX ORDER — DULAGLUTIDE 1.5 MG/.5ML
1.5 INJECTION, SOLUTION SUBCUTANEOUS
Qty: 6 ML | Refills: 3 | Status: SHIPPED | OUTPATIENT
Start: 2024-04-21

## 2024-04-17 RX ORDER — DAPAGLIFLOZIN 10 MG/1
10 TABLET, FILM COATED ORAL
Qty: 90 TABLET | Refills: 3 | Status: SHIPPED | OUTPATIENT
Start: 2024-04-17 | End: 2025-04-17

## 2024-05-17 DIAGNOSIS — K21.9 GASTROESOPHAGEAL REFLUX DISEASE, UNSPECIFIED WHETHER ESOPHAGITIS PRESENT: ICD-10-CM

## 2024-05-17 DIAGNOSIS — E78.1 HYPERTRIGLYCERIDEMIA: ICD-10-CM

## 2024-05-17 DIAGNOSIS — E11.9 TYPE 2 DIABETES MELLITUS WITHOUT COMPLICATION, WITHOUT LONG-TERM CURRENT USE OF INSULIN (MULTI): ICD-10-CM

## 2024-05-17 DIAGNOSIS — I10 BENIGN ESSENTIAL HYPERTENSION: ICD-10-CM

## 2024-05-17 RX ORDER — LISINOPRIL 5 MG/1
5 TABLET ORAL DAILY
Qty: 90 TABLET | Refills: 3 | Status: SHIPPED | OUTPATIENT
Start: 2024-05-17

## 2024-05-17 RX ORDER — PANTOPRAZOLE SODIUM 40 MG/1
40 TABLET, DELAYED RELEASE ORAL DAILY
Qty: 90 TABLET | Refills: 3 | Status: SHIPPED | OUTPATIENT
Start: 2024-05-17

## 2024-05-17 RX ORDER — ICOSAPENT ETHYL 1000 MG/1
CAPSULE ORAL
Qty: 360 CAPSULE | Refills: 3 | Status: SHIPPED | OUTPATIENT
Start: 2024-05-17

## 2024-05-17 RX ORDER — FAMOTIDINE 20 MG/1
20 TABLET, FILM COATED ORAL DAILY
Qty: 90 TABLET | Refills: 3 | Status: SHIPPED | OUTPATIENT
Start: 2024-05-17

## 2024-05-17 RX ORDER — METFORMIN HYDROCHLORIDE 500 MG/1
1000 TABLET ORAL EVERY 12 HOURS
Qty: 360 TABLET | Refills: 3 | Status: SHIPPED | OUTPATIENT
Start: 2024-05-17

## 2024-05-28 DIAGNOSIS — I10 BENIGN ESSENTIAL HYPERTENSION: ICD-10-CM

## 2024-05-29 RX ORDER — METOPROLOL TARTRATE 25 MG/1
25 TABLET, FILM COATED ORAL 2 TIMES DAILY
Qty: 180 TABLET | Refills: 1 | Status: SHIPPED | OUTPATIENT
Start: 2024-05-29 | End: 2025-05-29

## 2024-06-09 DIAGNOSIS — E78.2 MIXED HYPERLIPIDEMIA: ICD-10-CM

## 2024-06-10 RX ORDER — PRAVASTATIN SODIUM 40 MG/1
40 TABLET ORAL NIGHTLY
Qty: 90 TABLET | Refills: 0 | Status: SHIPPED | OUTPATIENT
Start: 2024-06-10

## 2024-06-18 ENCOUNTER — APPOINTMENT (OUTPATIENT)
Dept: PRIMARY CARE | Facility: CLINIC | Age: 59
End: 2024-06-18
Payer: COMMERCIAL

## 2024-06-26 PROBLEM — D38.5 NEOPLASM OF UNCERTAIN BEHAVIOR OF SEPTUM OF NOSE: Status: RESOLVED | Noted: 2024-06-26 | Resolved: 2024-06-26

## 2024-06-26 PROBLEM — K21.9 LPRD (LARYNGOPHARYNGEAL REFLUX DISEASE): Status: RESOLVED | Noted: 2024-06-26 | Resolved: 2024-06-26

## 2024-06-26 NOTE — PROGRESS NOTES
Subjective   Patient ID: Mikie Louie is a 59 y.o. male who presents for Follow-up (Unable to get Trulicity cost to expensive ).    HPI     The patient presents for a diabetes visit.   Last A1c: 7.5 in Dec   Medication regimen includes: farxiga, metformin, trulicity -  States trulicity is too expensive - stopped it earlier this year due to cost  Has only been taking metformin and farxiga   Have you seen your eye doctor this year? Dr. Piper- yes 1-2 months ago, denies retinopathy   Do you see a podiatrist (foot doc)? no  Peripheral neuropathy: no   Diet: pretty good   Exercise: some- coaches baseball   Tobacco use: no  Denies chest pain, palpitations, dyspnea, edema, vision changes, confusion, weakness, polyphagia, polydipsia, polyuria, nausea, vomiting, abdominal pain, paresthesias, changes in urination     HTN- lisinopril- BP controlled      HPL- pravastatin, lovaza      Hx SVT, A fib- following with cardiology-  saw Dr. 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 Varela     No additional concerns       Current Outpatient Medications   Medication Sig Dispense Refill    aspirin 81 mg EC tablet once every 24 hours.      blood sugar diagnostic (OneTouch Verio test strips) strip TEST ONCE DAILY      cholecalciferol (Vitamin D-3) 125 MCG (5000 UT) capsule Take 1 capsule (125 mcg) by mouth once daily.      dapagliflozin propanediol (Farxiga) 10 mg Take 1 tablet (10 mg) by mouth once daily in the morning. Take before meals. 90 tablet 3    famotidine (Pepcid) 20 mg tablet  TAKE 1 TABLET ONCE DAILY 90 tablet 3    fluticasone (Flonase) 50 mcg/actuation nasal spray Administer 2 sprays into each nostril once daily. 16 g 3    lancets misc One touch ultra soft lances      lisinopril 5 mg tablet TAKE 1 TABLET ONCE DAILY 90 tablet 3    loratadine (Claritin) 10 mg tablet Take 1 tablet (10 mg) by mouth once daily. 90 tablet 3    metFORMIN (Glucophage) 500 mg tablet TAKE 2 TABLETS EVERY 12    HOURS 360 tablet 3    metoprolol tartrate (Lopressor) 25 mg tablet Take 1 tablet (25 mg) by mouth 2 times a day. 180 tablet 1    mometasone (Nasonex) 50 mcg/actuation nasal spray Administer 2 sprays into each nostril once daily.      omega-3 acid ethyl esters (Lovaza) 1 gram capsule Take 2 capsules (2 g) by mouth 2 times a day.      pravastatin (Pravachol) 40 mg tablet TAKE 1 TABLET AT BEDTIME 90 tablet 0    Vascepa 1 gram capsule TAKE 2 CAPSULES TWICE DAILYWITH MEALS 360 capsule 3    pantoprazole (ProtoNix) 40 mg EC tablet Take 1 tablet (40 mg) by mouth 2 times a day. 90 tablet 3     No current facility-administered medications for this visit.       Review of Systems   Constitutional:  Negative for chills, fatigue and fever.   HENT:  Negative for congestion, ear pain and sore throat.    Eyes:  Negative for visual disturbance.   Respiratory:  Negative for cough and shortness of breath.    Cardiovascular:  Negative for chest pain, palpitations and leg swelling.   Gastrointestinal:  Negative for abdominal pain, constipation, diarrhea, nausea and vomiting.   Genitourinary: Negative.    Musculoskeletal: Negative.    Skin:  Negative for rash.   Neurological:  Negative for dizziness, weakness, numbness and headaches.   Psychiatric/Behavioral: Negative.           Scales reviewed    Patient Health Questionnaire-2 Score: 0 (06/27/24 1101)       Objective   /67 (BP Location: Right arm, Patient Position: Sitting, BP Cuff Size: Adult)   Pulse 50   Temp 35.6 °C (96.1 °F) (Temporal)   Resp 16   Ht 1.715 m (5'  "7.5\")   Wt 98.1 kg (216 lb 3.2 oz)   SpO2 98%   BMI 33.36 kg/m²     Physical Exam    Constitutional: Well developed, well nourished, alert and in no acute distress.  Head and Face: Normocephalic, atraumatic.  Eyes: Normal external exam. Pupils equally round and reactive to light with normal accommodation and extraocular movements intact.   Neck: Supple, no lymphadenopathy or masses.   Cardiovascular: Regular rate and rhythm, normal S1 and S2, no murmurs, gallops, or rubs. Radial pulses normal. No peripheral edema. No carotid bruits.   Pulmonary: No respiratory distress, lungs clear to auscultation bilaterally. No wheezes, rhonchi, rales.  Skin: Warm, well perfused, normal skin turgor and color, no lesions or rashes noted.   Neurologic: Cranial nerves II-XII grossly intact.   Psychiatric: Mood calm and affect normal.    Assessment/Plan   Problem List Items Addressed This Visit             ICD-10-CM    Mixed hyperlipidemia E78.2     Continue statin          Benign essential hypertension - Primary I10     Controlled, continue lisinopril, metoprolol          Relevant Orders    CBC and Auto Differential    Comprehensive Metabolic Panel    Diabetes mellitus, type 2 (Multi) E11.9     Check A1c- suspect is going to be higher as he has been off Trulicity for 6 months  Continue metformin, farxiga  Will have pharmacist assist with coverage of GLP1 -   May need started on 3rd oral medication          Relevant Orders    Hemoglobin A1C    Lipid Panel    GERD (gastroesophageal reflux disease) K21.9     Continue PPI, pepcid- follows with Dr. Varela          Relevant Medications    pantoprazole (ProtoNix) 40 mg EC tablet    Class 1 obesity without serious comorbidity with body mass index (BMI) of 33.0 to 33.9 in adult E66.9, Z68.33    SVT (supraventricular tachycardia) (CMS-HCC) I47.10    Paroxysmal atrial fibrillation (Multi) I48.0     Continue metoprolol, follows with cardiology   No recurrences following ablation          S/P " catheter ablation of slow pathway Z98.890, Z86.79    Hypertriglyceridemia E78.1     Continue lovaza           Other Visit Diagnoses         Codes    Need for hepatitis C screening test     Z11.59    Relevant Orders    Hepatitis C Antibody            Angeles Clarke DO  6/27/2024

## 2024-06-27 ENCOUNTER — LAB (OUTPATIENT)
Dept: LAB | Facility: LAB | Age: 59
End: 2024-06-27
Payer: COMMERCIAL

## 2024-06-27 ENCOUNTER — TELEPHONE (OUTPATIENT)
Dept: PRIMARY CARE | Facility: CLINIC | Age: 59
End: 2024-06-27

## 2024-06-27 ENCOUNTER — APPOINTMENT (OUTPATIENT)
Dept: PRIMARY CARE | Facility: CLINIC | Age: 59
End: 2024-06-27
Payer: COMMERCIAL

## 2024-06-27 VITALS
BODY MASS INDEX: 32.77 KG/M2 | WEIGHT: 216.2 LBS | DIASTOLIC BLOOD PRESSURE: 67 MMHG | HEART RATE: 50 BPM | OXYGEN SATURATION: 98 % | RESPIRATION RATE: 16 BRPM | HEIGHT: 68 IN | TEMPERATURE: 96.1 F | SYSTOLIC BLOOD PRESSURE: 113 MMHG

## 2024-06-27 DIAGNOSIS — Z86.79 S/P CATHETER ABLATION OF SLOW PATHWAY: ICD-10-CM

## 2024-06-27 DIAGNOSIS — I48.0 PAROXYSMAL ATRIAL FIBRILLATION (MULTI): ICD-10-CM

## 2024-06-27 DIAGNOSIS — I47.10 SVT (SUPRAVENTRICULAR TACHYCARDIA) (CMS-HCC): ICD-10-CM

## 2024-06-27 DIAGNOSIS — E78.2 MIXED HYPERLIPIDEMIA: ICD-10-CM

## 2024-06-27 DIAGNOSIS — I10 BENIGN ESSENTIAL HYPERTENSION: Primary | ICD-10-CM

## 2024-06-27 DIAGNOSIS — Z11.59 NEED FOR HEPATITIS C SCREENING TEST: ICD-10-CM

## 2024-06-27 DIAGNOSIS — E11.9 TYPE 2 DIABETES MELLITUS WITHOUT COMPLICATION, WITHOUT LONG-TERM CURRENT USE OF INSULIN (MULTI): ICD-10-CM

## 2024-06-27 DIAGNOSIS — Z98.890 S/P CATHETER ABLATION OF SLOW PATHWAY: ICD-10-CM

## 2024-06-27 DIAGNOSIS — E66.9 CLASS 1 OBESITY WITHOUT SERIOUS COMORBIDITY WITH BODY MASS INDEX (BMI) OF 33.0 TO 33.9 IN ADULT, UNSPECIFIED OBESITY TYPE: ICD-10-CM

## 2024-06-27 DIAGNOSIS — K21.9 GASTROESOPHAGEAL REFLUX DISEASE, UNSPECIFIED WHETHER ESOPHAGITIS PRESENT: ICD-10-CM

## 2024-06-27 DIAGNOSIS — E78.1 HYPERTRIGLYCERIDEMIA: ICD-10-CM

## 2024-06-27 DIAGNOSIS — I10 BENIGN ESSENTIAL HYPERTENSION: ICD-10-CM

## 2024-06-27 PROBLEM — R80.9 PROTEINURIA: Status: RESOLVED | Noted: 2023-10-17 | Resolved: 2024-06-27

## 2024-06-27 PROCEDURE — 85025 COMPLETE CBC W/AUTO DIFF WBC: CPT

## 2024-06-27 PROCEDURE — 99214 OFFICE O/P EST MOD 30 MIN: CPT | Performed by: FAMILY MEDICINE

## 2024-06-27 PROCEDURE — 90471 IMMUNIZATION ADMIN: CPT | Performed by: FAMILY MEDICINE

## 2024-06-27 PROCEDURE — 3074F SYST BP LT 130 MM HG: CPT | Performed by: FAMILY MEDICINE

## 2024-06-27 PROCEDURE — 80053 COMPREHEN METABOLIC PANEL: CPT

## 2024-06-27 PROCEDURE — 3008F BODY MASS INDEX DOCD: CPT | Performed by: FAMILY MEDICINE

## 2024-06-27 PROCEDURE — 86803 HEPATITIS C AB TEST: CPT

## 2024-06-27 PROCEDURE — 4010F ACE/ARB THERAPY RXD/TAKEN: CPT | Performed by: FAMILY MEDICINE

## 2024-06-27 PROCEDURE — 83036 HEMOGLOBIN GLYCOSYLATED A1C: CPT

## 2024-06-27 PROCEDURE — 1036F TOBACCO NON-USER: CPT | Performed by: FAMILY MEDICINE

## 2024-06-27 PROCEDURE — 80061 LIPID PANEL: CPT

## 2024-06-27 PROCEDURE — 90677 PCV20 VACCINE IM: CPT | Performed by: FAMILY MEDICINE

## 2024-06-27 PROCEDURE — 3078F DIAST BP <80 MM HG: CPT | Performed by: FAMILY MEDICINE

## 2024-06-27 PROCEDURE — 36415 COLL VENOUS BLD VENIPUNCTURE: CPT

## 2024-06-27 RX ORDER — PANTOPRAZOLE SODIUM 40 MG/1
40 TABLET, DELAYED RELEASE ORAL 2 TIMES DAILY
Qty: 90 TABLET | Refills: 3 | Status: SHIPPED | OUTPATIENT
Start: 2024-06-27

## 2024-06-27 ASSESSMENT — ENCOUNTER SYMPTOMS
DIZZINESS: 0
NUMBNESS: 0
CHILLS: 0
SORE THROAT: 0
WEAKNESS: 0
NAUSEA: 0
CONSTIPATION: 0
PALPITATIONS: 0
COUGH: 0
FATIGUE: 0
VOMITING: 0
ABDOMINAL PAIN: 0
MUSCULOSKELETAL NEGATIVE: 1
PSYCHIATRIC NEGATIVE: 1
SHORTNESS OF BREATH: 0
DIARRHEA: 0
HEADACHES: 0
FEVER: 0

## 2024-06-27 ASSESSMENT — PATIENT HEALTH QUESTIONNAIRE - PHQ9
1. LITTLE INTEREST OR PLEASURE IN DOING THINGS: NOT AT ALL
SUM OF ALL RESPONSES TO PHQ9 QUESTIONS 1 AND 2: 0
2. FEELING DOWN, DEPRESSED OR HOPELESS: NOT AT ALL

## 2024-06-27 NOTE — TELEPHONE ENCOUNTER
Dmitriy, patient having cost issues with trulicity.  You've spoken to him previously. Can you help see if any GLP1 is attainable for him?  If not, let me know and I will need to start him on another oral med for diabetes.

## 2024-06-27 NOTE — ASSESSMENT & PLAN NOTE
Check A1c- suspect is going to be higher as he has been off Trulicity for 6 months  Continue metformin, farxiga  Will have pharmacist assist with coverage of GLP1 -   May need started on 3rd oral medication

## 2024-06-28 LAB
ALBUMIN SERPL BCP-MCNC: 4.3 G/DL (ref 3.4–5)
ALP SERPL-CCNC: 40 U/L (ref 33–120)
ALT SERPL W P-5'-P-CCNC: 50 U/L (ref 10–52)
ANION GAP SERPL CALC-SCNC: 15 MMOL/L (ref 10–20)
AST SERPL W P-5'-P-CCNC: 34 U/L (ref 9–39)
BASOPHILS # BLD AUTO: 0.04 X10*3/UL (ref 0–0.1)
BASOPHILS NFR BLD AUTO: 0.7 %
BILIRUB SERPL-MCNC: 0.9 MG/DL (ref 0–1.2)
BUN SERPL-MCNC: 17 MG/DL (ref 6–23)
CALCIUM SERPL-MCNC: 9.7 MG/DL (ref 8.6–10.6)
CHLORIDE SERPL-SCNC: 102 MMOL/L (ref 98–107)
CHOLEST SERPL-MCNC: 121 MG/DL (ref 0–199)
CHOLESTEROL/HDL RATIO: 4
CO2 SERPL-SCNC: 26 MMOL/L (ref 21–32)
CREAT SERPL-MCNC: 1.04 MG/DL (ref 0.5–1.3)
EGFRCR SERPLBLD CKD-EPI 2021: 83 ML/MIN/1.73M*2
EOSINOPHIL # BLD AUTO: 0.18 X10*3/UL (ref 0–0.7)
EOSINOPHIL NFR BLD AUTO: 3.3 %
ERYTHROCYTE [DISTWIDTH] IN BLOOD BY AUTOMATED COUNT: 13.1 % (ref 11.5–14.5)
EST. AVERAGE GLUCOSE BLD GHB EST-MCNC: 212 MG/DL
GLUCOSE SERPL-MCNC: 163 MG/DL (ref 74–99)
HBA1C MFR BLD: 9 %
HCT VFR BLD AUTO: 46.2 % (ref 41–52)
HCV AB SER QL: NONREACTIVE
HDLC SERPL-MCNC: 30.4 MG/DL
HGB BLD-MCNC: 16.1 G/DL (ref 13.5–17.5)
IMM GRANULOCYTES # BLD AUTO: 0.02 X10*3/UL (ref 0–0.7)
IMM GRANULOCYTES NFR BLD AUTO: 0.4 % (ref 0–0.9)
LDLC SERPL CALC-MCNC: 55 MG/DL
LYMPHOCYTES # BLD AUTO: 1.45 X10*3/UL (ref 1.2–4.8)
LYMPHOCYTES NFR BLD AUTO: 26.2 %
MCH RBC QN AUTO: 31 PG (ref 26–34)
MCHC RBC AUTO-ENTMCNC: 34.8 G/DL (ref 32–36)
MCV RBC AUTO: 89 FL (ref 80–100)
MONOCYTES # BLD AUTO: 0.53 X10*3/UL (ref 0.1–1)
MONOCYTES NFR BLD AUTO: 9.6 %
NEUTROPHILS # BLD AUTO: 3.31 X10*3/UL (ref 1.2–7.7)
NEUTROPHILS NFR BLD AUTO: 59.8 %
NON HDL CHOLESTEROL: 91 MG/DL (ref 0–149)
NRBC BLD-RTO: 0 /100 WBCS (ref 0–0)
PLATELET # BLD AUTO: 169 X10*3/UL (ref 150–450)
POTASSIUM SERPL-SCNC: 4 MMOL/L (ref 3.5–5.3)
PROT SERPL-MCNC: 7.4 G/DL (ref 6.4–8.2)
RBC # BLD AUTO: 5.19 X10*6/UL (ref 4.5–5.9)
SODIUM SERPL-SCNC: 139 MMOL/L (ref 136–145)
TRIGL SERPL-MCNC: 178 MG/DL (ref 0–149)
VLDL: 36 MG/DL (ref 0–40)
WBC # BLD AUTO: 5.5 X10*3/UL (ref 4.4–11.3)

## 2024-07-03 NOTE — TELEPHONE ENCOUNTER
Please call the patient and let him know our pharmacist Dmitriy has been trying to reach him in regarding to his diabetes meds- it looks like Truliciyt, Ozempic, and Mounjaro are all covered under his plan.

## 2024-07-05 NOTE — TELEPHONE ENCOUNTER
Spoke with pt. He would like to get Trulicity. Pt states at one time is was going to cost over $600. Please advise.

## 2024-07-09 ENCOUNTER — TELEMEDICINE (OUTPATIENT)
Dept: PHARMACY | Facility: HOSPITAL | Age: 59
End: 2024-07-09
Payer: COMMERCIAL

## 2024-07-09 DIAGNOSIS — E11.9 TYPE 2 DIABETES MELLITUS WITHOUT COMPLICATION, WITHOUT LONG-TERM CURRENT USE OF INSULIN (MULTI): ICD-10-CM

## 2024-07-09 DIAGNOSIS — E11.9 TYPE 2 DIABETES MELLITUS WITHOUT COMPLICATION, WITHOUT LONG-TERM CURRENT USE OF INSULIN (MULTI): Primary | ICD-10-CM

## 2024-07-09 PROCEDURE — RXMED WILLOW AMBULATORY MEDICATION CHARGE

## 2024-07-09 RX ORDER — DULAGLUTIDE 0.75 MG/.5ML
0.75 INJECTION, SOLUTION SUBCUTANEOUS
Qty: 2 ML | Refills: 0 | Status: SHIPPED | OUTPATIENT
Start: 2024-07-14

## 2024-07-09 NOTE — ASSESSMENT & PLAN NOTE
Patient uncontrolled with most recent A1C of 9.5% (goal <7%).  Patient currently taking Metformin 1,00 mg BID, Farxiga 10 mg once daily, and Trulicity 1.5 mg once weekly.  Patient has been out of Trulicity since the beginning of the year due to insurance and availability of the medication.  Informed patient that Trulicity is covered under his insurance and the  pharmacies have the medication in stock.  Patient was agreeable to restart Trulicity.          PLAN:   - Restart Trulicity 0.75 mg once weekly injection.  [facility]   - Continue current medications (Metformin and Farxiga) unchanged      Follow up: 3 weeks

## 2024-07-09 NOTE — PROGRESS NOTES
Subjective   Patient ID: Mikie Louie is a 59 y.o. male who presents for Diabetes.    Referring Provider: Angeles Clarke DO     Diabetes  He presents for his follow-up diabetic visit. He has type 2 diabetes mellitus. His disease course has been stable. There are no hypoglycemic associated symptoms. There are no hypoglycemic complications. He is compliant with treatment all of the time. There is no change in his home blood glucose trend. An ACE inhibitor/angiotensin II receptor blocker is being taken.       Review of Systems    Objective     There were no vitals taken for this visit.     Labs  Lab Results   Component Value Date    BILITOT 0.9 06/27/2024    CALCIUM 9.7 06/27/2024    CO2 26 06/27/2024     06/27/2024    CREATININE 1.04 06/27/2024    GLUCOSE 163 (H) 06/27/2024    ALKPHOS 40 06/27/2024    K 4.0 06/27/2024    PROT 7.4 06/27/2024     06/27/2024    AST 34 06/27/2024    ALT 50 06/27/2024    BUN 17 06/27/2024    ANIONGAP 15 06/27/2024    ALBUMIN 4.3 06/27/2024    GFRMALE 70 06/05/2023     Lab Results   Component Value Date    TRIG 178 (H) 06/27/2024    CHOL 121 06/27/2024    LDLCALC 55 06/27/2024    HDL 30.4 06/27/2024     Lab Results   Component Value Date    HGBA1C 9.0 (H) 06/27/2024       Current Outpatient Medications on File Prior to Visit   Medication Sig Dispense Refill    aspirin 81 mg EC tablet once every 24 hours.      blood sugar diagnostic (OneTouch Verio test strips) strip TEST ONCE DAILY      cholecalciferol (Vitamin D-3) 125 MCG (5000 UT) capsule Take 1 capsule (125 mcg) by mouth once daily.      dapagliflozin propanediol (Farxiga) 10 mg Take 1 tablet (10 mg) by mouth once daily in the morning. Take before meals. 90 tablet 3    famotidine (Pepcid) 20 mg tablet TAKE 1 TABLET ONCE DAILY 90 tablet 3    fluticasone (Flonase) 50 mcg/actuation nasal spray Administer 2 sprays into each nostril once daily. 16 g 3    lancets misc One touch ultra soft lances      lisinopril 5 mg tablet  TAKE 1 TABLET ONCE DAILY 90 tablet 3    loratadine (Claritin) 10 mg tablet Take 1 tablet (10 mg) by mouth once daily. 90 tablet 3    metFORMIN (Glucophage) 500 mg tablet TAKE 2 TABLETS EVERY 12    HOURS 360 tablet 3    metoprolol tartrate (Lopressor) 25 mg tablet Take 1 tablet (25 mg) by mouth 2 times a day. 180 tablet 1    mometasone (Nasonex) 50 mcg/actuation nasal spray Administer 2 sprays into each nostril once daily.      omega-3 acid ethyl esters (Lovaza) 1 gram capsule Take 2 capsules (2 g) by mouth 2 times a day.      pantoprazole (ProtoNix) 40 mg EC tablet Take 1 tablet (40 mg) by mouth 2 times a day. 90 tablet 3    pravastatin (Pravachol) 40 mg tablet TAKE 1 TABLET AT BEDTIME 90 tablet 0    Vascepa 1 gram capsule TAKE 2 CAPSULES TWICE DAILYWITH MEALS 360 capsule 3     No current facility-administered medications on file prior to visit.      Trulicity Education:     - Counseled patient on Trulicity MOA, expectations, side effects, duration of therapy, administration, and monitoring parameters.  - Provided detailed dosing and administration counseling to ensure proper technique.   - Reviewed Trulicity titration schedule, starting with 0.75 mg once weekly for 4 weeks, then increase to 1.5 mg once weekly. Pt verbalized understanding.  - Counseled patient on the benefits of GLP-1ra, such as cardiovascular risk reduction, glycemic control, and weight loss potential.  - Reviewed storage requirements of Trulicity when not in use, and when to administer the medication if a dose is missed.  - Advised patient that they may experience improved satiety after meals and portion sizes of meals may be reduced as doses of Trulicity increase.  - Counseled patient to avoid foods that are fatty/oily as this may precipitate the nausea/GI upset that may occur with new start Trulicity.     Assessment/Plan   Problem List Items Addressed This Visit       Diabetes mellitus, type 2 (Multi)     Patient uncontrolled with most recent  A1C of 9.5% (goal <7%).  Patient currently taking Metformin 1,00 mg BID, Farxiga 10 mg once daily, and Trulicity 1.5 mg once weekly.  Patient has been out of Trulicity since the beginning of the year due to insurance and availability of the medication.  Informed patient that Trulicity is covered under his insurance and the  pharmacies have the medication in stock.  Patient was agreeable to restart Trulicity.          PLAN:   - Restart Trulicity 0.75 mg once weekly injection.  Prescription sent to [facility] to be mailed to patient   - Continue current medications (Metformin and Farxiga) unchanged      Follow up: 3 weeks            Relevant Medications    dulaglutide (Trulicity) 0.75 mg/0.5 mL pen injector (Start on 7/14/2024)    Other Relevant Orders    Follow Up In [facility] - Pharmacy     Bessie Escoto, PharmD    Continue all meds under the continuation of care with the referring provider and clinical pharmacy team.

## 2024-07-12 ENCOUNTER — PHARMACY VISIT (OUTPATIENT)
Dept: PHARMACY | Facility: CLINIC | Age: 59
End: 2024-07-12
Payer: MEDICARE

## 2024-07-30 ENCOUNTER — APPOINTMENT (OUTPATIENT)
Dept: PHARMACY | Facility: HOSPITAL | Age: 59
End: 2024-07-30
Payer: COMMERCIAL

## 2024-07-30 DIAGNOSIS — E11.9 TYPE 2 DIABETES MELLITUS WITHOUT COMPLICATION, WITHOUT LONG-TERM CURRENT USE OF INSULIN (MULTI): ICD-10-CM

## 2024-07-30 PROCEDURE — RXMED WILLOW AMBULATORY MEDICATION CHARGE

## 2024-07-30 RX ORDER — DULAGLUTIDE 1.5 MG/.5ML
1.5 INJECTION, SOLUTION SUBCUTANEOUS
Qty: 2 ML | Refills: 0 | Status: SHIPPED | OUTPATIENT
Start: 2024-08-04

## 2024-07-30 NOTE — PROGRESS NOTES
Subjective   Patient ID: Mikie Louie is a 59 y.o. male who presents for Diabetes.    Referring Provider: Angeles Clarke DO     Diabetes  He presents for his follow-up diabetic visit. He has type 2 diabetes mellitus. His disease course has been stable. There are no hypoglycemic associated symptoms. There are no hypoglycemic complications. He is compliant with treatment all of the time. There is no change in his home blood glucose trend. An ACE inhibitor/angiotensin II receptor blocker is being taken.       Review of Systems    Objective     There were no vitals taken for this visit.     Labs  Lab Results   Component Value Date    BILITOT 0.9 06/27/2024    CALCIUM 9.7 06/27/2024    CO2 26 06/27/2024     06/27/2024    CREATININE 1.04 06/27/2024    GLUCOSE 163 (H) 06/27/2024    ALKPHOS 40 06/27/2024    K 4.0 06/27/2024    PROT 7.4 06/27/2024     06/27/2024    AST 34 06/27/2024    ALT 50 06/27/2024    BUN 17 06/27/2024    ANIONGAP 15 06/27/2024    ALBUMIN 4.3 06/27/2024    GFRMALE 70 06/05/2023     Lab Results   Component Value Date    TRIG 178 (H) 06/27/2024    CHOL 121 06/27/2024    LDLCALC 55 06/27/2024    HDL 30.4 06/27/2024     Lab Results   Component Value Date    HGBA1C 9.0 (H) 06/27/2024       Current Outpatient Medications on File Prior to Visit   Medication Sig Dispense Refill    aspirin 81 mg EC tablet once every 24 hours.      blood sugar diagnostic (OneTouch Verio test strips) strip TEST ONCE DAILY      cholecalciferol (Vitamin D-3) 125 MCG (5000 UT) capsule Take 1 capsule (125 mcg) by mouth once daily.      dapagliflozin propanediol (Farxiga) 10 mg Take 1 tablet (10 mg) by mouth once daily in the morning. Take before meals. 90 tablet 3    famotidine (Pepcid) 20 mg tablet TAKE 1 TABLET ONCE DAILY 90 tablet 3    fluticasone (Flonase) 50 mcg/actuation nasal spray Administer 2 sprays into each nostril once daily. 16 g 3    lancets misc One touch ultra soft lances      lisinopril 5 mg tablet  TAKE 1 TABLET ONCE DAILY 90 tablet 3    loratadine (Claritin) 10 mg tablet Take 1 tablet (10 mg) by mouth once daily. 90 tablet 3    metFORMIN (Glucophage) 500 mg tablet TAKE 2 TABLETS EVERY 12    HOURS 360 tablet 3    metoprolol tartrate (Lopressor) 25 mg tablet Take 1 tablet (25 mg) by mouth 2 times a day. 180 tablet 1    mometasone (Nasonex) 50 mcg/actuation nasal spray Administer 2 sprays into each nostril once daily.      omega-3 acid ethyl esters (Lovaza) 1 gram capsule Take 2 capsules (2 g) by mouth 2 times a day.      pantoprazole (ProtoNix) 40 mg EC tablet Take 1 tablet (40 mg) by mouth 2 times a day. 90 tablet 3    pravastatin (Pravachol) 40 mg tablet TAKE 1 TABLET AT BEDTIME 90 tablet 0    Vascepa 1 gram capsule TAKE 2 CAPSULES TWICE DAILYWITH MEALS 360 capsule 3    [DISCONTINUED] dulaglutide (Trulicity) 0.75 mg/0.5 mL pen injector Inject 0.75 mg under the skin 1 (one) time per week. 2 mL 0     No current facility-administered medications on file prior to visit.      Trulicity Education:     - Counseled patient on Trulicity MOA, expectations, side effects, duration of therapy, administration, and monitoring parameters.  - Provided detailed dosing and administration counseling to ensure proper technique.   - Reviewed Trulicity titration schedule, starting with 0.75 mg once weekly for 4 weeks, then increase to 1.5 mg once weekly. Pt verbalized understanding.  - Counseled patient on the benefits of GLP-1ra, such as cardiovascular risk reduction, glycemic control, and weight loss potential.  - Reviewed storage requirements of Trulicity when not in use, and when to administer the medication if a dose is missed.  - Advised patient that they may experience improved satiety after meals and portion sizes of meals may be reduced as doses of Trulicity increase.  - Counseled patient to avoid foods that are fatty/oily as this may precipitate the nausea/GI upset that may occur with new start Trulicity.      Assessment/Plan   Problem List Items Addressed This Visit       Diabetes mellitus, type 2 (Multi)     Patient uncontrolled with most recent A1C of 9.5% (goal <7%).  Patient currently taking Metformin 1,00 mg BID, Farxiga 10 mg once daily, and Trulicity 0.75 mg once weekly.  Since last visit patient restarted Trulicity and reports no side effects so far.  Patient did notice a decrease in his blood sugar.  Discussed increasing the dose and patient was agreeable.      PLAN:   - Increase to Trulicity 1.5 mg once weekly injection.  Prescription sent to [facility] to be mailed to patient   - Continue current medications (Metformin and Farxiga) unchanged    - Continue to test blood sugar once daily in the morning     Follow up: 4 weeks            Relevant Medications    dulaglutide (Trulicity) 1.5 mg/0.5 mL pen injector injection (Start on 8/4/2024)    Other Relevant Orders    Follow Up In [facility] - Pharmacy     Bessie Escoto, PharmD    Continue all meds under the continuation of care with the referring provider and clinical pharmacy team.

## 2024-07-30 NOTE — ASSESSMENT & PLAN NOTE
Patient uncontrolled with most recent A1C of 9.5% (goal <7%).  Patient currently taking Metformin 1,00 mg BID, Farxiga 10 mg once daily, and Trulicity 0.75 mg once weekly.  Since last visit patient restarted Trulicity and reports no side effects so far.  Patient did notice a decrease in his blood sugar.  Discussed increasing the dose and patient was agreeable.      PLAN:   - Increase to Trulicity 1.5 mg once weekly injection.  Prescription sent to [facility] to be mailed to patient   - Continue current medications (Metformin and Farxiga) unchanged    - Continue to test blood sugar once daily in the morning     Follow up: 4 weeks

## 2024-08-12 ENCOUNTER — PHARMACY VISIT (OUTPATIENT)
Dept: PHARMACY | Facility: CLINIC | Age: 59
End: 2024-08-12
Payer: MEDICARE

## 2024-08-15 DIAGNOSIS — E78.2 MIXED HYPERLIPIDEMIA: ICD-10-CM

## 2024-08-15 RX ORDER — PRAVASTATIN SODIUM 40 MG/1
40 TABLET ORAL NIGHTLY
Qty: 90 TABLET | Refills: 1 | Status: SHIPPED | OUTPATIENT
Start: 2024-08-15

## 2024-08-27 ENCOUNTER — APPOINTMENT (OUTPATIENT)
Dept: PHARMACY | Facility: HOSPITAL | Age: 59
End: 2024-08-27
Payer: COMMERCIAL

## 2024-08-27 DIAGNOSIS — E11.9 TYPE 2 DIABETES MELLITUS WITHOUT COMPLICATION, WITHOUT LONG-TERM CURRENT USE OF INSULIN (MULTI): Primary | ICD-10-CM

## 2024-08-27 RX ORDER — DULAGLUTIDE 1.5 MG/.5ML
1.5 INJECTION, SOLUTION SUBCUTANEOUS
Qty: 2 ML | Refills: 3 | Status: SHIPPED | OUTPATIENT
Start: 2024-08-27

## 2024-08-27 NOTE — ASSESSMENT & PLAN NOTE
Patient uncontrolled with most recent A1C of 9.5% (goal <7%).  Patient currently taking Metformin 1,00 mg BID, Farxiga 10 mg once daily, and Trulicity 1.5 mg once weekly.  Since last visit patient increased the dose of Trulicity and reports no side effects so far.  Patient did notice a decrease in his blood sugar and is also working on diet changes.  Discussed increasing the dose however the patient would like to wait until after his next A1C to make any medication changes.      PLAN:   - Continue Trulicity 1.5 mg once weekly injection.  Prescription sent to [facility] to be mailed to patient   - Continue current medications (Metformin and Farxiga) unchanged    - Continue to test blood sugar once daily in the morning     Follow up: 6 weeks

## 2024-08-27 NOTE — PROGRESS NOTES
Subjective   Patient ID: Mikie Louie is a 59 y.o. male who presents for Diabetes.    Referring Provider: Angeles Clarke DO     Diabetes  He presents for his follow-up diabetic visit. He has type 2 diabetes mellitus. His disease course has been stable. There are no hypoglycemic associated symptoms. There are no hypoglycemic complications. He is compliant with treatment all of the time. There is no change in his home blood glucose trend. An ACE inhibitor/angiotensin II receptor blocker is being taken.       Review of Systems    Objective     There were no vitals taken for this visit.     Labs  Lab Results   Component Value Date    BILITOT 0.9 06/27/2024    CALCIUM 9.7 06/27/2024    CO2 26 06/27/2024     06/27/2024    CREATININE 1.04 06/27/2024    GLUCOSE 163 (H) 06/27/2024    ALKPHOS 40 06/27/2024    K 4.0 06/27/2024    PROT 7.4 06/27/2024     06/27/2024    AST 34 06/27/2024    ALT 50 06/27/2024    BUN 17 06/27/2024    ANIONGAP 15 06/27/2024    ALBUMIN 4.3 06/27/2024    GFRMALE 70 06/05/2023     Lab Results   Component Value Date    TRIG 178 (H) 06/27/2024    CHOL 121 06/27/2024    LDLCALC 55 06/27/2024    HDL 30.4 06/27/2024     Lab Results   Component Value Date    HGBA1C 9.0 (H) 06/27/2024       Current Outpatient Medications on File Prior to Visit   Medication Sig Dispense Refill    aspirin 81 mg EC tablet once every 24 hours.      blood sugar diagnostic (OneTouch Verio test strips) strip TEST ONCE DAILY      cholecalciferol (Vitamin D-3) 125 MCG (5000 UT) capsule Take 1 capsule (125 mcg) by mouth once daily.      dapagliflozin propanediol (Farxiga) 10 mg Take 1 tablet (10 mg) by mouth once daily in the morning. Take before meals. 90 tablet 3    famotidine (Pepcid) 20 mg tablet TAKE 1 TABLET ONCE DAILY 90 tablet 3    fluticasone (Flonase) 50 mcg/actuation nasal spray Administer 2 sprays into each nostril once daily. 16 g 3    lancets misc One touch ultra soft lances      lisinopril 5 mg tablet  TAKE 1 TABLET ONCE DAILY 90 tablet 3    loratadine (Claritin) 10 mg tablet Take 1 tablet (10 mg) by mouth once daily. 90 tablet 3    metFORMIN (Glucophage) 500 mg tablet TAKE 2 TABLETS EVERY 12    HOURS 360 tablet 3    metoprolol tartrate (Lopressor) 25 mg tablet Take 1 tablet (25 mg) by mouth 2 times a day. 180 tablet 1    mometasone (Nasonex) 50 mcg/actuation nasal spray Administer 2 sprays into each nostril once daily.      omega-3 acid ethyl esters (Lovaza) 1 gram capsule Take 2 capsules (2 g) by mouth 2 times a day.      pantoprazole (ProtoNix) 40 mg EC tablet Take 1 tablet (40 mg) by mouth 2 times a day. 90 tablet 3    pravastatin (Pravachol) 40 mg tablet TAKE 1 TABLET AT BEDTIME 90 tablet 1    Vascepa 1 gram capsule TAKE 2 CAPSULES TWICE DAILYWITH MEALS 360 capsule 3    [DISCONTINUED] dulaglutide (Trulicity) 1.5 mg/0.5 mL pen injector injection Inject 1.5 mg under the skin 1 (one) time per week. 2 mL 0     No current facility-administered medications on file prior to visit.      Trulicity Education:     - Counseled patient on Trulicity MOA, expectations, side effects, duration of therapy, administration, and monitoring parameters.  - Provided detailed dosing and administration counseling to ensure proper technique.   - Reviewed Trulicity titration schedule, starting with 0.75 mg once weekly for 4 weeks, then increase to 1.5 mg once weekly. Pt verbalized understanding.  - Counseled patient on the benefits of GLP-1ra, such as cardiovascular risk reduction, glycemic control, and weight loss potential.  - Reviewed storage requirements of Trulicity when not in use, and when to administer the medication if a dose is missed.  - Advised patient that they may experience improved satiety after meals and portion sizes of meals may be reduced as doses of Trulicity increase.  - Counseled patient to avoid foods that are fatty/oily as this may precipitate the nausea/GI upset that may occur with new start Trulicity.      Assessment/Plan   Problem List Items Addressed This Visit       Diabetes mellitus, type 2 (Multi) - Primary     Patient uncontrolled with most recent A1C of 9.5% (goal <7%).  Patient currently taking Metformin 1,00 mg BID, Farxiga 10 mg once daily, and Trulicity 1.5 mg once weekly.  Since last visit patient increased the dose of Trulicity and reports no side effects so far.  Patient did notice a decrease in his blood sugar and is also working on diet changes.  Discussed increasing the dose however the patient would like to wait until after his next A1C to make any medication changes.      PLAN:   - Continue Trulicity 1.5 mg once weekly injection.  Prescription sent to [facility] to be mailed to patient   - Continue current medications (Metformin and Farxiga) unchanged    - Continue to test blood sugar once daily in the morning     Follow up: 6 weeks            Relevant Medications    dulaglutide (Trulicity) 1.5 mg/0.5 mL pen injector injection    Other Relevant Orders    Follow Up In [facility] - Pharmacy     Bessie Esocto, PharmD    Continue all meds under the continuation of care with the referring provider and clinical pharmacy team.

## 2024-09-17 PROCEDURE — RXMED WILLOW AMBULATORY MEDICATION CHARGE

## 2024-09-20 ENCOUNTER — PHARMACY VISIT (OUTPATIENT)
Dept: PHARMACY | Facility: CLINIC | Age: 59
End: 2024-09-20
Payer: MEDICARE

## 2024-10-02 NOTE — PROGRESS NOTES
Subjective   Patient ID: Mikie Louie is a 59 y.o. male who presents for Follow-up, Diabetes, and Immunizations.    HPI     The patient presents for a diabetes visit.   Last A1c:  9.0 in June   Medication regimen includes: farxiga, metformin, trulicity   Have you seen your eye doctor this year? Dr. Piper- yes, denies retinopathy   Reports 120s-130s   Do you see a podiatrist (foot doc)? no  Peripheral neuropathy: no   Diet: pretty good   Exercise: no- needs to start   Tobacco use: no  Denies chest pain, palpitations, dyspnea, edema, vision changes, confusion, weakness, polyphagia, polydipsia, polyuria, nausea, vomiting, abdominal pain, paresthesias, changes in urination     HTN- lisinopril- BP controlled      HPL- pravastatin, lovaza      Hx SVT, A fib- following with cardiology-  saw Dr. 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      blood sugar diagnostic (OneTouch Verio test strips) strip TEST ONCE DAILY      cholecalciferol (Vitamin D-3) 125 MCG (5000 UT) capsule Take 1 capsule (125 mcg) by mouth once daily.      dapagliflozin propanediol  (Farxiga) 10 mg Take 1 tablet (10 mg) by mouth once daily in the morning. Take before meals. 90 tablet 3    dulaglutide (Trulicity) 1.5 mg/0.5 mL pen injector injection Inject 1.5 mg under the skin 1 (one) time per week. 2 mL 3    famotidine (Pepcid) 20 mg tablet TAKE 1 TABLET ONCE DAILY 90 tablet 3    fluticasone (Flonase) 50 mcg/actuation nasal spray Administer 2 sprays into each nostril once daily. 16 g 3    lancets misc One touch ultra soft lances      lisinopril 5 mg tablet TAKE 1 TABLET ONCE DAILY 90 tablet 3    loratadine (Claritin) 10 mg tablet Take 1 tablet (10 mg) by mouth once daily. 90 tablet 3    metFORMIN (Glucophage) 500 mg tablet TAKE 2 TABLETS EVERY 12    HOURS 360 tablet 3    metoprolol tartrate (Lopressor) 25 mg tablet Take 1 tablet (25 mg) by mouth 2 times a day. 180 tablet 1    mometasone (Nasonex) 50 mcg/actuation nasal spray Administer 2 sprays into each nostril once daily.      omega-3 acid ethyl esters (Lovaza) 1 gram capsule Take 2 capsules (2 g) by mouth 2 times a day.      pravastatin (Pravachol) 40 mg tablet TAKE 1 TABLET AT BEDTIME 90 tablet 1    Vascepa 1 gram capsule TAKE 2 CAPSULES TWICE DAILYWITH MEALS 360 capsule 3     No current facility-administered medications for this visit.       Review of Systems   Constitutional:  Negative for chills, fatigue and fever.   HENT:  Positive for hearing loss. Negative for ear discharge and ear pain.    Respiratory:  Negative for cough and shortness of breath.    Cardiovascular:  Negative for chest pain and palpitations.         Objective   /73 (BP Location: Right arm, Patient Position: Sitting, BP Cuff Size: Adult)   Pulse 62   Temp 36.1 °C (96.9 °F) (Temporal)   Resp 16   Wt 98.1 kg (216 lb 4.8 oz)   SpO2 98%   BMI 33.38 kg/m²     Physical Exam    Constitutional: Well developed, well nourished, alert and in no acute distress.  Head and Face: Normocephalic, atraumatic.  Eyes: Normal external exam. Pupils equally round and reactive to  light with normal accommodation and extraocular movements intact.   ENT: External inspection of ears normal, tympanic membranes obstructed by cerumen bilaterally.   Neck: Supple, no lymphadenopathy or masses.   Cardiovascular: Regular rate and rhythm, normal S1 and S2, no murmurs, gallops, or rubs. Radial pulses normal. No peripheral edema.   Pulmonary: No respiratory distress, lungs clear to auscultation bilaterally. No wheezes, rhonchi, rales.  Musculoskeletal: Gait normal. Muscle strength/tone normal. Normal range of motion of all extremities.   Skin: Warm, well perfused, normal skin turgor and color, no lesions or rashes noted.   Neurologic: Cranial nerves II-XII grossly intact.   Psychiatric: Mood calm and affect normal.      Assessment/Plan     Problem List Items Addressed This Visit       Mixed hyperlipidemia    Current Assessment & Plan     Continue statin, lovaza          Benign essential hypertension    Current Assessment & Plan     Controlled, continue lisinopril, metoprolol          Diabetes mellitus, type 2 (Multi) - Primary    Current Assessment & Plan     A1c 7.8 today- he declines changes to his medication regimen  Continue metformin, farxiga, trulicity at current dosing          Relevant Orders    POCT glycosylated hemoglobin (Hb A1C) manually resulted (Completed)    Follow Up In [facility] - PCP - Established    GERD (gastroesophageal reflux disease)    Overview     EGD 12/8/22          Current Assessment & Plan     Continue PPI, pepcid- follows with Dr. Varela          Class 1 obesity without serious comorbidity with body mass index (BMI) of 33.0 to 33.9 in adult    Paroxysmal atrial fibrillation (Multi)    Current Assessment & Plan     Continue metoprolol, follows with cardiology   No recurrences following ablation          Status post placement of implantable loop recorder    S/P catheter ablation of slow pathway    Hypertriglyceridemia     Other Visit Diagnoses       Immunization  due        Relevant Orders    Flu vaccine, trivalent, preservative free, age 6 months and greater (Fluarix/Fluzone/Flulaval) (Completed)    Hearing disorder, unspecified laterality        Relevant Orders    Referral to Audiology    Referral to ENT    Bilateral impacted cerumen        Relevant Orders    Ear cerumen removal          I was unable to completely remove the cerumen with use of magnification provided by an otoscope and an ear curette.  Cerumen irrigation was performed in the office with water and peroxide with success by the medical assistant.    The patient tolerated the procedure well.  Repeat examination shows that the tympanic membranes are normal without signs of infection.      Follow up in 3 months.      Angeles Clarke,   10/3/2024

## 2024-10-03 ENCOUNTER — APPOINTMENT (OUTPATIENT)
Dept: PRIMARY CARE | Facility: CLINIC | Age: 59
End: 2024-10-03
Payer: COMMERCIAL

## 2024-10-03 VITALS
SYSTOLIC BLOOD PRESSURE: 110 MMHG | WEIGHT: 216.3 LBS | DIASTOLIC BLOOD PRESSURE: 73 MMHG | HEART RATE: 62 BPM | BODY MASS INDEX: 33.38 KG/M2 | TEMPERATURE: 96.9 F | OXYGEN SATURATION: 98 % | RESPIRATION RATE: 16 BRPM

## 2024-10-03 DIAGNOSIS — K21.9 GASTROESOPHAGEAL REFLUX DISEASE, UNSPECIFIED WHETHER ESOPHAGITIS PRESENT: ICD-10-CM

## 2024-10-03 DIAGNOSIS — Z86.79 S/P CATHETER ABLATION OF SLOW PATHWAY: ICD-10-CM

## 2024-10-03 DIAGNOSIS — Z98.890 S/P CATHETER ABLATION OF SLOW PATHWAY: ICD-10-CM

## 2024-10-03 DIAGNOSIS — Z95.818 STATUS POST PLACEMENT OF IMPLANTABLE LOOP RECORDER: ICD-10-CM

## 2024-10-03 DIAGNOSIS — E11.9 TYPE 2 DIABETES MELLITUS WITHOUT COMPLICATION, WITHOUT LONG-TERM CURRENT USE OF INSULIN (MULTI): Primary | ICD-10-CM

## 2024-10-03 DIAGNOSIS — I48.0 PAROXYSMAL ATRIAL FIBRILLATION (MULTI): ICD-10-CM

## 2024-10-03 DIAGNOSIS — E78.2 MIXED HYPERLIPIDEMIA: ICD-10-CM

## 2024-10-03 DIAGNOSIS — H61.23 BILATERAL IMPACTED CERUMEN: ICD-10-CM

## 2024-10-03 DIAGNOSIS — Z23 IMMUNIZATION DUE: ICD-10-CM

## 2024-10-03 DIAGNOSIS — E78.1 HYPERTRIGLYCERIDEMIA: ICD-10-CM

## 2024-10-03 DIAGNOSIS — H91.90 HEARING DISORDER, UNSPECIFIED LATERALITY: ICD-10-CM

## 2024-10-03 DIAGNOSIS — E66.811 CLASS 1 OBESITY WITHOUT SERIOUS COMORBIDITY WITH BODY MASS INDEX (BMI) OF 33.0 TO 33.9 IN ADULT, UNSPECIFIED OBESITY TYPE: ICD-10-CM

## 2024-10-03 DIAGNOSIS — I10 BENIGN ESSENTIAL HYPERTENSION: ICD-10-CM

## 2024-10-03 PROBLEM — I47.10 SVT (SUPRAVENTRICULAR TACHYCARDIA) (CMS-HCC): Status: RESOLVED | Noted: 2023-06-05 | Resolved: 2024-10-03

## 2024-10-03 LAB — POC HEMOGLOBIN A1C: 7.8 % (ref 4.2–6.5)

## 2024-10-03 PROCEDURE — 90656 IIV3 VACC NO PRSV 0.5 ML IM: CPT | Performed by: FAMILY MEDICINE

## 2024-10-03 PROCEDURE — 3048F LDL-C <100 MG/DL: CPT | Performed by: FAMILY MEDICINE

## 2024-10-03 PROCEDURE — 83036 HEMOGLOBIN GLYCOSYLATED A1C: CPT | Performed by: FAMILY MEDICINE

## 2024-10-03 PROCEDURE — 3078F DIAST BP <80 MM HG: CPT | Performed by: FAMILY MEDICINE

## 2024-10-03 PROCEDURE — 90471 IMMUNIZATION ADMIN: CPT | Performed by: FAMILY MEDICINE

## 2024-10-03 PROCEDURE — 99214 OFFICE O/P EST MOD 30 MIN: CPT | Performed by: FAMILY MEDICINE

## 2024-10-03 PROCEDURE — 3074F SYST BP LT 130 MM HG: CPT | Performed by: FAMILY MEDICINE

## 2024-10-03 PROCEDURE — 1036F TOBACCO NON-USER: CPT | Performed by: FAMILY MEDICINE

## 2024-10-03 PROCEDURE — 69209 REMOVE IMPACTED EAR WAX UNI: CPT | Performed by: FAMILY MEDICINE

## 2024-10-03 PROCEDURE — 4010F ACE/ARB THERAPY RXD/TAKEN: CPT | Performed by: FAMILY MEDICINE

## 2024-10-03 PROCEDURE — 3052F HG A1C>EQUAL 8.0%<EQUAL 9.0%: CPT | Performed by: FAMILY MEDICINE

## 2024-10-03 ASSESSMENT — ENCOUNTER SYMPTOMS
COUGH: 0
SHORTNESS OF BREATH: 0
FEVER: 0
CHILLS: 0
FATIGUE: 0
PALPITATIONS: 0

## 2024-10-03 NOTE — ASSESSMENT & PLAN NOTE
A1c 7.8 today- he declines changes to his medication regimen  Continue metformin, farxiga, trulicity at current dosing

## 2024-10-03 NOTE — PROGRESS NOTES
Patient ID: Mikie Louie is a 59 y.o. male.    Ear Cerumen Removal    Date/Time: 10/3/2024 1:10 PM    Performed by: Shaneka Aviles CMA  Authorized by: Angeles Clarke DO    Consent:     Consent obtained:  Verbal    Consent given by:  Patient  Universal protocol:     Patient identity confirmed:  Verbally with patient  Procedure details:     Location:  R ear and L ear    Procedure type: irrigation      Procedure outcomes: cerumen removed    Post-procedure details:     Inspection:  Ear canal clear    Hearing quality:  Normal    Procedure completion:  Tolerated

## 2024-10-08 ENCOUNTER — APPOINTMENT (OUTPATIENT)
Dept: PHARMACY | Facility: HOSPITAL | Age: 59
End: 2024-10-08
Payer: COMMERCIAL

## 2024-10-08 DIAGNOSIS — E11.9 TYPE 2 DIABETES MELLITUS WITHOUT COMPLICATION, WITHOUT LONG-TERM CURRENT USE OF INSULIN (MULTI): ICD-10-CM

## 2024-10-08 NOTE — PROGRESS NOTES
Subjective   Patient ID: Mikie Louie is a 59 y.o. male who presents for Diabetes.    Referring Provider: Angeles Clarke DO     Diabetes  He presents for his follow-up diabetic visit. He has type 2 diabetes mellitus. His disease course has been stable. There are no hypoglycemic associated symptoms. There are no hypoglycemic complications. He is compliant with treatment all of the time. There is no change in his home blood glucose trend. An ACE inhibitor/angiotensin II receptor blocker is being taken.       Review of Systems    Objective     There were no vitals taken for this visit.     Labs  Lab Results   Component Value Date    BILITOT 0.9 06/27/2024    CALCIUM 9.7 06/27/2024    CO2 26 06/27/2024     06/27/2024    CREATININE 1.04 06/27/2024    GLUCOSE 163 (H) 06/27/2024    ALKPHOS 40 06/27/2024    K 4.0 06/27/2024    PROT 7.4 06/27/2024     06/27/2024    AST 34 06/27/2024    ALT 50 06/27/2024    BUN 17 06/27/2024    ANIONGAP 15 06/27/2024    ALBUMIN 4.3 06/27/2024    GFRMALE 70 06/05/2023     Lab Results   Component Value Date    TRIG 178 (H) 06/27/2024    CHOL 121 06/27/2024    LDLCALC 55 06/27/2024    HDL 30.4 06/27/2024     Lab Results   Component Value Date    HGBA1C 7.8 (A) 10/03/2024       Current Outpatient Medications on File Prior to Visit   Medication Sig Dispense Refill    aspirin 81 mg EC tablet once every 24 hours.      blood sugar diagnostic (OneTouch Verio test strips) strip TEST ONCE DAILY      cholecalciferol (Vitamin D-3) 125 MCG (5000 UT) capsule Take 1 capsule (125 mcg) by mouth once daily.      dapagliflozin propanediol (Farxiga) 10 mg Take 1 tablet (10 mg) by mouth once daily in the morning. Take before meals. 90 tablet 3    dulaglutide (Trulicity) 1.5 mg/0.5 mL pen injector injection Inject 1.5 mg under the skin 1 (one) time per week. 2 mL 3    famotidine (Pepcid) 20 mg tablet TAKE 1 TABLET ONCE DAILY 90 tablet 3    fluticasone (Flonase) 50 mcg/actuation nasal spray  Administer 2 sprays into each nostril once daily. 16 g 3    lancets misc One touch ultra soft lances      lisinopril 5 mg tablet TAKE 1 TABLET ONCE DAILY 90 tablet 3    loratadine (Claritin) 10 mg tablet Take 1 tablet (10 mg) by mouth once daily. 90 tablet 3    metFORMIN (Glucophage) 500 mg tablet TAKE 2 TABLETS EVERY 12    HOURS 360 tablet 3    metoprolol tartrate (Lopressor) 25 mg tablet Take 1 tablet (25 mg) by mouth 2 times a day. 180 tablet 1    mometasone (Nasonex) 50 mcg/actuation nasal spray Administer 2 sprays into each nostril once daily.      omega-3 acid ethyl esters (Lovaza) 1 gram capsule Take 2 capsules (2 g) by mouth 2 times a day.      pravastatin (Pravachol) 40 mg tablet TAKE 1 TABLET AT BEDTIME 90 tablet 1    Vascepa 1 gram capsule TAKE 2 CAPSULES TWICE DAILYWITH MEALS 360 capsule 3    [DISCONTINUED] pantoprazole (ProtoNix) 40 mg EC tablet Take 1 tablet (40 mg) by mouth 2 times a day. 90 tablet 3     No current facility-administered medications on file prior to visit.      Trulicity Education:     - Counseled patient on Trulicity MOA, expectations, side effects, duration of therapy, administration, and monitoring parameters.  - Provided detailed dosing and administration counseling to ensure proper technique.   - Reviewed Trulicity titration schedule, starting with 0.75 mg once weekly for 4 weeks, then increase to 1.5 mg once weekly. Pt verbalized understanding.  - Counseled patient on the benefits of GLP-1ra, such as cardiovascular risk reduction, glycemic control, and weight loss potential.  - Reviewed storage requirements of Trulicity when not in use, and when to administer the medication if a dose is missed.  - Advised patient that they may experience improved satiety after meals and portion sizes of meals may be reduced as doses of Trulicity increase.  - Counseled patient to avoid foods that are fatty/oily as this may precipitate the nausea/GI upset that may occur with new start Trulicity.      Assessment/Plan   Problem List Items Addressed This Visit       Diabetes mellitus, type 2 (Multi)     Patient uncontrolled with most recent A1C of 7.8% (goal <7%).  Patient currently taking Metformin 1,00 mg BID, Farxiga 10 mg once daily, and Trulicity 1.5 mg once weekly.  Since last visit patient continued the dose of Trulicity and reports no side effects.  Patient did notice a decrease in his blood sugar and is also working on diet changes.  Discussed increasing the dose since his A1C was still above goal, however the patient would like to wait until after his next PCP follow up and lab work.      PLAN:   - Continue Trulicity 1.5 mg once weekly injection.  [facility]   - Continue current medications (Metformin and Farxiga) unchanged    - Continue to test blood sugar once daily in the morning     Follow up: 3 months per patient request.             Relevant Orders    Referral to Clinical Pharmacy     Bessie Escoto, PharmD    Continue all meds under the continuation of care with the referring provider and clinical pharmacy team.

## 2024-10-08 NOTE — ASSESSMENT & PLAN NOTE
Patient uncontrolled with most recent A1C of 7.8% (goal <7%).  Patient currently taking Metformin 1,00 mg BID, Farxiga 10 mg once daily, and Trulicity 1.5 mg once weekly.  Since last visit patient continued the dose of Trulicity and reports no side effects.  Patient did notice a decrease in his blood sugar and is also working on diet changes.  Discussed increasing the dose since his A1C was still above goal, however the patient would like to wait until after his next PCP follow up and lab work.      PLAN:   - Continue Trulicity 1.5 mg once weekly injection.  Prescription sent to [facility] to be mailed to patient   - Continue current medications (Metformin and Farxiga) unchanged    - Continue to test blood sugar once daily in the morning     Follow up: 3 months per patient request.

## 2024-10-22 PROCEDURE — RXMED WILLOW AMBULATORY MEDICATION CHARGE

## 2024-10-24 ENCOUNTER — PHARMACY VISIT (OUTPATIENT)
Dept: PHARMACY | Facility: CLINIC | Age: 59
End: 2024-10-24
Payer: MEDICARE

## 2024-11-07 ENCOUNTER — APPOINTMENT (OUTPATIENT)
Dept: AUDIOLOGY | Facility: CLINIC | Age: 59
End: 2024-11-07
Payer: COMMERCIAL

## 2024-11-07 DIAGNOSIS — H90.3 SENSORINEURAL HEARING LOSS (SNHL) OF BOTH EARS: Primary | ICD-10-CM

## 2024-11-07 DIAGNOSIS — H93.291 IMPAIRED AUDITORY DISCRIMINATION, RIGHT: ICD-10-CM

## 2024-11-07 PROCEDURE — 92550 TYMPANOMETRY & REFLEX THRESH: CPT | Performed by: AUDIOLOGIST

## 2024-11-07 PROCEDURE — 92557 COMPREHENSIVE HEARING TEST: CPT | Performed by: AUDIOLOGIST

## 2024-11-07 NOTE — PROGRESS NOTES
"COMPREHENSIVE AUDIOMETRIC EVALUATION      Name:  Mikie Louie  :  1965  Age:  59 y.o.  Date of Evaluation:  24   Referring Provider:  Angeles Clarke DO     History:  Mr. Louie was seen today  for an evaluation of hearing.  Patient reported concern for hearing loss.  Patient reported concerns for reduced high pitch hearing sensitivity occurring gradually over the past few years. Patient reported a past ear surgery for \"effusion\".  Patient additionally reported medical history significant for type II diabetes. When asked, patient denied otalgia, tinnitus, dizziness, history of noise exposure, and treatment by chemotherapy or radiation    See audiometric evaluation at end of this report or scanned under media tab    OTOSCOPY:       Right Ear: Minimal non-occluding cerumen       Left Ear: Clear canal    226 Hz TYMPANOMETRY:       Right Ear: Type A: normal peak pressure, compliance, and ear canal volume, consistent with middle ear function within normal limits       Left Ear: Type A: normal peak pressure, compliance, and ear canal volume, consistent with middle ear function within normal limits    AUDIOMETRIC EVALUATION (Phones):       Right Ear: Normal sloping to Moderately severe Sensorineural hearing loss                 Left Ear: Normal sloping to Moderately severe Sensorineural hearing loss           Test technique:  Standard Audiometry  Reliability:   good    SPEECH RECOGNITION THRESHOLD:       Right Ear:  20 dBHL in fair agreement with PTA       Left Ear:  20 dBHL in fair agreement with PTA    WORD RECOGNITION:       Right Ear:  excellent (90%) at elevated presentation level       Left Ear:  excellent (100%) at elevated presentation level    IPSILATERAL ACOUSTIC REFLEXES:       Right Ear:  WNL from 500- 4000 Hz, consistent with patient's hearing sensitivity       Left Ear:    WNL from 500- 4000 Hz, consistent with patient's hearing sensitivity    DISCUSSION:   Discussed results and " recommendations with patient.  Questions were addressed and the patient was encouraged to contact our department should concerns arise.  Patient was given a copy of his audiometric evaluation to bring with him to [facility].    RECOMMENDATIONS:  -Recommend patient continue planned follow up with [facility]  -Recommend patient move forward with hearing aids, binaurally  -Recommend patient continue annual monitoring of hearing sensitivity due to known relationship between hearing loss and diabetes    Bijan Elmore, CCC-A     Appt: 1:00 - 1:30 PM

## 2024-11-14 PROCEDURE — RXMED WILLOW AMBULATORY MEDICATION CHARGE

## 2024-11-16 ENCOUNTER — PHARMACY VISIT (OUTPATIENT)
Dept: PHARMACY | Facility: CLINIC | Age: 59
End: 2024-11-16
Payer: MEDICARE

## 2024-12-11 ENCOUNTER — APPOINTMENT (OUTPATIENT)
Dept: PRIMARY CARE | Facility: CLINIC | Age: 59
End: 2024-12-11
Payer: COMMERCIAL

## 2024-12-12 PROCEDURE — RXMED WILLOW AMBULATORY MEDICATION CHARGE

## 2024-12-13 ENCOUNTER — PHARMACY VISIT (OUTPATIENT)
Dept: PHARMACY | Facility: CLINIC | Age: 59
End: 2024-12-13
Payer: MEDICARE

## 2025-01-09 ENCOUNTER — LAB (OUTPATIENT)
Dept: LAB | Facility: LAB | Age: 60
End: 2025-01-09
Payer: COMMERCIAL

## 2025-01-09 ENCOUNTER — APPOINTMENT (OUTPATIENT)
Dept: PRIMARY CARE | Facility: CLINIC | Age: 60
End: 2025-01-09
Payer: COMMERCIAL

## 2025-01-09 VITALS
SYSTOLIC BLOOD PRESSURE: 96 MMHG | RESPIRATION RATE: 16 BRPM | TEMPERATURE: 96.6 F | WEIGHT: 209.7 LBS | DIASTOLIC BLOOD PRESSURE: 57 MMHG | HEART RATE: 71 BPM | OXYGEN SATURATION: 98 % | BODY MASS INDEX: 31.78 KG/M2 | HEIGHT: 68 IN

## 2025-01-09 DIAGNOSIS — I48.0 PAROXYSMAL ATRIAL FIBRILLATION (MULTI): ICD-10-CM

## 2025-01-09 DIAGNOSIS — G89.29 CHRONIC RIGHT SHOULDER PAIN: ICD-10-CM

## 2025-01-09 DIAGNOSIS — Z12.5 SCREENING FOR MALIGNANT NEOPLASM OF PROSTATE: ICD-10-CM

## 2025-01-09 DIAGNOSIS — M75.81 TENDINITIS OF RIGHT ROTATOR CUFF: ICD-10-CM

## 2025-01-09 DIAGNOSIS — M25.511 CHRONIC RIGHT SHOULDER PAIN: ICD-10-CM

## 2025-01-09 DIAGNOSIS — E78.2 MIXED HYPERLIPIDEMIA: ICD-10-CM

## 2025-01-09 DIAGNOSIS — E11.9 TYPE 2 DIABETES MELLITUS WITHOUT COMPLICATION, WITHOUT LONG-TERM CURRENT USE OF INSULIN (MULTI): ICD-10-CM

## 2025-01-09 DIAGNOSIS — Z00.00 ANNUAL PHYSICAL EXAM: Primary | ICD-10-CM

## 2025-01-09 DIAGNOSIS — I10 BENIGN ESSENTIAL HYPERTENSION: ICD-10-CM

## 2025-01-09 DIAGNOSIS — K21.9 GASTROESOPHAGEAL REFLUX DISEASE, UNSPECIFIED WHETHER ESOPHAGITIS PRESENT: ICD-10-CM

## 2025-01-09 DIAGNOSIS — E66.811 CLASS 1 OBESITY WITHOUT SERIOUS COMORBIDITY WITH BODY MASS INDEX (BMI) OF 32.0 TO 32.9 IN ADULT, UNSPECIFIED OBESITY TYPE: ICD-10-CM

## 2025-01-09 DIAGNOSIS — E78.1 HYPERTRIGLYCERIDEMIA: ICD-10-CM

## 2025-01-09 PROBLEM — L98.9 SCALP LESION: Status: RESOLVED | Noted: 2023-10-17 | Resolved: 2025-01-09

## 2025-01-09 PROBLEM — K20.90 ESOPHAGITIS: Status: RESOLVED | Noted: 2023-10-17 | Resolved: 2025-01-09

## 2025-01-09 PROCEDURE — 3078F DIAST BP <80 MM HG: CPT | Performed by: FAMILY MEDICINE

## 2025-01-09 PROCEDURE — 83036 HEMOGLOBIN GLYCOSYLATED A1C: CPT

## 2025-01-09 PROCEDURE — G0103 PSA SCREENING: HCPCS

## 2025-01-09 PROCEDURE — 82043 UR ALBUMIN QUANTITATIVE: CPT

## 2025-01-09 PROCEDURE — 99214 OFFICE O/P EST MOD 30 MIN: CPT | Performed by: FAMILY MEDICINE

## 2025-01-09 PROCEDURE — 82570 ASSAY OF URINE CREATININE: CPT

## 2025-01-09 PROCEDURE — 99396 PREV VISIT EST AGE 40-64: CPT | Performed by: FAMILY MEDICINE

## 2025-01-09 PROCEDURE — 1036F TOBACCO NON-USER: CPT | Performed by: FAMILY MEDICINE

## 2025-01-09 PROCEDURE — 3008F BODY MASS INDEX DOCD: CPT | Performed by: FAMILY MEDICINE

## 2025-01-09 PROCEDURE — 3074F SYST BP LT 130 MM HG: CPT | Performed by: FAMILY MEDICINE

## 2025-01-09 PROCEDURE — 80048 BASIC METABOLIC PNL TOTAL CA: CPT

## 2025-01-09 PROCEDURE — 4010F ACE/ARB THERAPY RXD/TAKEN: CPT | Performed by: FAMILY MEDICINE

## 2025-01-09 RX ORDER — DULAGLUTIDE 1.5 MG/.5ML
1.5 INJECTION, SOLUTION SUBCUTANEOUS
Qty: 2 ML | Refills: 3 | Status: CANCELLED | OUTPATIENT
Start: 2025-01-12

## 2025-01-09 ASSESSMENT — PATIENT HEALTH QUESTIONNAIRE - PHQ9
SUM OF ALL RESPONSES TO PHQ9 QUESTIONS 1 AND 2: 0
1. LITTLE INTEREST OR PLEASURE IN DOING THINGS: NOT AT ALL
2. FEELING DOWN, DEPRESSED OR HOPELESS: NOT AT ALL

## 2025-01-09 ASSESSMENT — ENCOUNTER SYMPTOMS
SORE THROAT: 0
DIZZINESS: 0
WEAKNESS: 0
PSYCHIATRIC NEGATIVE: 1
PALPITATIONS: 0
ABDOMINAL PAIN: 0
FEVER: 0
COUGH: 0
CHILLS: 0
HEADACHES: 0
FATIGUE: 0
CONSTIPATION: 0
NAUSEA: 0
ARTHRALGIAS: 1
SHORTNESS OF BREATH: 0
DIARRHEA: 0
NUMBNESS: 0
VOMITING: 0

## 2025-01-09 ASSESSMENT — PROMIS GLOBAL HEALTH SCALE
RATE_AVERAGE_PAIN: 5
RATE_PHYSICAL_HEALTH: VERY GOOD
RATE_AVERAGE_FATIGUE: MODERATE
EMOTIONAL_PROBLEMS: NEVER
RATE_GENERAL_HEALTH: VERY GOOD
RATE_QUALITY_OF_LIFE: VERY GOOD
RATE_MENTAL_HEALTH: EXCELLENT
CARRYOUT_PHYSICAL_ACTIVITIES: COMPLETELY
RATE_SOCIAL_SATISFACTION: EXCELLENT
CARRYOUT_SOCIAL_ACTIVITIES: EXCELLENT

## 2025-01-09 NOTE — PROGRESS NOTES
Subjective   Patient ID: Mikie Louie is a 59 y.o. male who presents for Annual Exam, Follow-up, and Diabetes.    HPI     The patient presents for his annual wellness exam.   Hx of colonoscopy: 12/8/22 polyps - repeat 5 years   Hx of prostate cancer screening (men 55-69): due   Immunizations: utd   History of depression or anxiety: no     The patient presents for a diabetes visit.   Last A1c: 7.8 in Oct   Medication regimen includes: farxiga, metformin, trulicity   Have you seen your eye doctor this year? Dr. Piper- yes, denies retinopathy   Do you see a podiatrist (foot doc)? no  Peripheral neuropathy: no   Diet: pretty good   Exercise: no- needs to start   Tobacco use: no  Denies chest pain, palpitations, dyspnea, edema, vision changes, confusion, weakness, polyphagia, polydipsia, polyuria, nausea, vomiting, abdominal pain, paresthesias, changes in urination     HTN- lisinopril- BP controlled      HPL- pravastatin, lovaza      Hx SVT, A fib- following with cardiology-  saw Dr. 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    Discussed anticoagulation with him but he wanted to hold off to see if it happens again - now just on aspirin   Continued on metoprolol BID  No recurrence of SVT since the ablation      GERD- on pepcid and PPI (not sure which one)   EGD 2022  GI- Dr. Varela        Having pain and difficulty throwing with right shoulder- end of the throw  Some ROM issues - both with internal rotation and flexion   No injuries or falls   Has been doing home exercises   Saw Dr. Burgess - told rotator cuff issue- will follow up with him for further eval       Current Outpatient Medications   Medication Sig Dispense Refill    aspirin 81 mg EC tablet once every 24 hours.      blood sugar diagnostic (OneTouch Verio test strips) strip  TEST ONCE DAILY      cholecalciferol (Vitamin D-3) 125 MCG (5000 UT) capsule Take 1 capsule (125 mcg) by mouth once daily.      dapagliflozin propanediol (Farxiga) 10 mg Take 1 tablet (10 mg) by mouth once daily in the morning. Take before meals. 90 tablet 3    famotidine (Pepcid) 20 mg tablet TAKE 1 TABLET ONCE DAILY 90 tablet 3    fluticasone (Flonase) 50 mcg/actuation nasal spray Administer 2 sprays into each nostril once daily. 16 g 3    lancets misc One touch ultra soft lances      lisinopril 5 mg tablet TAKE 1 TABLET ONCE DAILY 90 tablet 3    loratadine (Claritin) 10 mg tablet Take 1 tablet (10 mg) by mouth once daily. 90 tablet 3    metFORMIN (Glucophage) 500 mg tablet TAKE 2 TABLETS EVERY 12    HOURS 360 tablet 3    metoprolol tartrate (Lopressor) 25 mg tablet Take 1 tablet (25 mg) by mouth 2 times a day. 180 tablet 1    mometasone (Nasonex) 50 mcg/actuation nasal spray Administer 2 sprays into each nostril once daily.      omega-3 acid ethyl esters (Lovaza) 1 gram capsule Take 2 capsules (2 g) by mouth 2 times a day.      pravastatin (Pravachol) 40 mg tablet TAKE 1 TABLET AT BEDTIME 90 tablet 1    Vascepa 1 gram capsule TAKE 2 CAPSULES TWICE DAILYWITH MEALS 360 capsule 3    dulaglutide (Trulicity) 3 mg/0.5 mL injection Inject 3 mg under the skin 1 (one) time per week. 2 mL 11     No current facility-administered medications for this visit.       Review of Systems   Constitutional:  Negative for chills, fatigue and fever.   HENT:  Negative for congestion, ear pain and sore throat.    Eyes:  Negative for visual disturbance.   Respiratory:  Negative for cough and shortness of breath.    Cardiovascular:  Negative for chest pain, palpitations and leg swelling.   Gastrointestinal:  Negative for abdominal pain, constipation, diarrhea, nausea and vomiting.   Genitourinary: Negative.    Musculoskeletal:  Positive for arthralgias.   Skin:  Negative for rash.   Neurological:  Negative for dizziness, weakness, numbness  "and headaches.   Psychiatric/Behavioral: Negative.           Scales reviewed    Patient Health Questionnaire-2 Score: 0 (01/09/25 1403)       Objective   BP 96/57 (BP Location: Left arm, Patient Position: Sitting, BP Cuff Size: Adult)   Pulse 71   Temp 35.9 °C (96.6 °F) (Temporal)   Resp 16   Ht 1.715 m (5' 7.5\")   Wt 95.1 kg (209 lb 11.2 oz)   SpO2 98%   BMI 32.36 kg/m²     Physical Exam    Constitutional: Well developed, well nourished, alert and in no acute distress.  Head and Face: Normocephalic, atraumatic.  Eyes: Normal external exam. Pupils equally round and reactive to light with normal accommodation and extraocular movements intact.   ENT: External inspection of ears normal, tympanic membranes visualized and normal. Nasal mucosa, septum, and turbinates normal. Oral mucosa moist, oropharynx clear.   Neck: Supple, no lymphadenopathy or masses. Thyroid not enlarged, no palpable nodules.   Cardiovascular: Regular rate and rhythm, normal S1 and S2, no murmurs, gallops, or rubs. Radial pulses normal. No peripheral edema. No carotid bruits.   Pulmonary: No respiratory distress, lungs clear to auscultation bilaterally. No wheezes, rhonchi, rales.  Abdomen: Soft, nontender, nondistended, normal bowel sounds. No masses palpated.   Musculoskeletal: Gait normal. Muscle strength/tone normal. Normal range of motion of all extremities.   Skin: Warm, well perfused, normal skin turgor and color, no lesions or rashes noted.   Neurologic: Cranial nerves II-XII grossly intact. Sensation normal bilaterally.   Psychiatric: Mood calm and affect normal.      Assessment/Plan     Problem List Items Addressed This Visit       Mixed hyperlipidemia    Current Assessment & Plan     Continue statin, lovaza          Benign essential hypertension    Current Assessment & Plan     Controlled, continue lisinopril, metoprolol          Diabetes mellitus, type 2 (Multi)    Current Assessment & Plan     Check A1c  Continue Metformin 1,000 " mg BID, and Farxiga 10 mg once daily  Increase Trulicity to 3 mg once weekly           Relevant Orders    Hemoglobin A1C    Basic Metabolic Panel    Albumin-Creatinine Ratio, Urine Random    GERD (gastroesophageal reflux disease)    Overview     EGD 12/8/22          Current Assessment & Plan     Continue PPI, pepcid- follows with Dr. Varela          Class 1 obesity without serious comorbidity with body mass index (BMI) of 32.0 to 32.9 in adult    Paroxysmal atrial fibrillation (Multi)    Current Assessment & Plan     Continue metoprolol and aspirin, follows with cardiology   No recurrences following ablation          Hypertriglyceridemia     Other Visit Diagnoses       Annual physical exam    -  Primary    Chronic right shoulder pain        Tendinitis of right rotator cuff        He will follow up with orthopedics for possible steroid injection    Screening for malignant neoplasm of prostate        Relevant Orders    Prostate Spec.Ag,Screen            Follow up in 6 months.      Angeles Clarke,   1/9/2025

## 2025-01-09 NOTE — ASSESSMENT & PLAN NOTE
Check A1c  Continue Metformin 1,000 mg BID, and Farxiga 10 mg once daily  Increase Trulicity to 3 mg once weekly

## 2025-01-10 PROBLEM — R97.20 RISING PSA LEVEL: Status: ACTIVE | Noted: 2025-01-10

## 2025-01-10 LAB
ANION GAP SERPL CALC-SCNC: 16 MMOL/L (ref 10–20)
BUN SERPL-MCNC: 26 MG/DL (ref 6–23)
CALCIUM SERPL-MCNC: 10 MG/DL (ref 8.6–10.6)
CHLORIDE SERPL-SCNC: 101 MMOL/L (ref 98–107)
CO2 SERPL-SCNC: 27 MMOL/L (ref 21–32)
CREAT SERPL-MCNC: 0.93 MG/DL (ref 0.5–1.3)
CREAT UR-MCNC: 119.8 MG/DL (ref 20–370)
EGFRCR SERPLBLD CKD-EPI 2021: >90 ML/MIN/1.73M*2
EST. AVERAGE GLUCOSE BLD GHB EST-MCNC: 154 MG/DL
GLUCOSE SERPL-MCNC: 99 MG/DL (ref 74–99)
HBA1C MFR BLD: 7 %
MICROALBUMIN UR-MCNC: 7.4 MG/L
MICROALBUMIN/CREAT UR: 6.2 UG/MG CREAT
POTASSIUM SERPL-SCNC: 4.1 MMOL/L (ref 3.5–5.3)
PSA SERPL-MCNC: 3.5 NG/ML
SODIUM SERPL-SCNC: 140 MMOL/L (ref 136–145)

## 2025-01-14 ENCOUNTER — APPOINTMENT (OUTPATIENT)
Dept: PHARMACY | Facility: HOSPITAL | Age: 60
End: 2025-01-14
Payer: COMMERCIAL

## 2025-01-14 DIAGNOSIS — E11.9 TYPE 2 DIABETES MELLITUS WITHOUT COMPLICATION, WITHOUT LONG-TERM CURRENT USE OF INSULIN (MULTI): ICD-10-CM

## 2025-01-14 PROCEDURE — RXMED WILLOW AMBULATORY MEDICATION CHARGE

## 2025-01-14 NOTE — PROGRESS NOTES
Subjective   Patient ID: Mikie Louie is a 59 y.o. male who presents for Diabetes.    Referring Provider: Angeles Clarke,      Diabetes  He presents for his follow-up diabetic visit. He has type 2 diabetes mellitus. His disease course has been stable. There are no hypoglycemic associated symptoms. There are no hypoglycemic complications. He is compliant with treatment all of the time. There is no change in his home blood glucose trend. An ACE inhibitor/angiotensin II receptor blocker is being taken.       Review of Systems    Objective     There were no vitals taken for this visit.     Labs  Lab Results   Component Value Date    BILITOT 0.9 06/27/2024    CALCIUM 10.0 01/09/2025    CO2 27 01/09/2025     01/09/2025    CREATININE 0.93 01/09/2025    GLUCOSE 99 01/09/2025    ALKPHOS 40 06/27/2024    K 4.1 01/09/2025    PROT 7.4 06/27/2024     01/09/2025    AST 34 06/27/2024    ALT 50 06/27/2024    BUN 26 (H) 01/09/2025    ANIONGAP 16 01/09/2025    ALBUMIN 4.3 06/27/2024    GFRMALE 70 06/05/2023     Lab Results   Component Value Date    TRIG 178 (H) 06/27/2024    CHOL 121 06/27/2024    LDLCALC 55 06/27/2024    HDL 30.4 06/27/2024     Lab Results   Component Value Date    HGBA1C 7.0 (H) 01/09/2025       Current Outpatient Medications on File Prior to Visit   Medication Sig Dispense Refill    aspirin 81 mg EC tablet once every 24 hours.      blood sugar diagnostic (OneTouch Verio test strips) strip TEST ONCE DAILY      cholecalciferol (Vitamin D-3) 125 MCG (5000 UT) capsule Take 1 capsule (125 mcg) by mouth once daily.      dapagliflozin propanediol (Farxiga) 10 mg Take 1 tablet (10 mg) by mouth once daily in the morning. Take before meals. 90 tablet 3    dulaglutide (Trulicity) 3 mg/0.5 mL injection Inject 3 mg under the skin 1 (one) time per week. 2 mL 11    famotidine (Pepcid) 20 mg tablet TAKE 1 TABLET ONCE DAILY 90 tablet 3    fluticasone (Flonase) 50 mcg/actuation nasal spray Administer 2 sprays  into each nostril once daily. 16 g 3    lancets misc One touch ultra soft lances      lisinopril 5 mg tablet TAKE 1 TABLET ONCE DAILY 90 tablet 3    loratadine (Claritin) 10 mg tablet Take 1 tablet (10 mg) by mouth once daily. 90 tablet 3    metFORMIN (Glucophage) 500 mg tablet TAKE 2 TABLETS EVERY 12    HOURS 360 tablet 3    metoprolol tartrate (Lopressor) 25 mg tablet Take 1 tablet (25 mg) by mouth 2 times a day. 180 tablet 1    mometasone (Nasonex) 50 mcg/actuation nasal spray Administer 2 sprays into each nostril once daily.      omega-3 acid ethyl esters (Lovaza) 1 gram capsule Take 2 capsules (2 g) by mouth 2 times a day.      pravastatin (Pravachol) 40 mg tablet TAKE 1 TABLET AT BEDTIME 90 tablet 1    Vascepa 1 gram capsule TAKE 2 CAPSULES TWICE DAILYWITH MEALS 360 capsule 3    [DISCONTINUED] dulaglutide (Trulicity) 1.5 mg/0.5 mL pen injector injection Inject 1.5 mg under the skin 1 (one) time per week. 2 mL 3     No current facility-administered medications on file prior to visit.      Trulicity Education:     - Counseled patient on Trulicity MOA, expectations, side effects, duration of therapy, administration, and monitoring parameters.  - Provided detailed dosing and administration counseling to ensure proper technique.   - Reviewed Trulicity titration schedule, starting with 0.75 mg once weekly for 4 weeks, then increase to 1.5 mg once weekly. Pt verbalized understanding.  - Counseled patient on the benefits of GLP-1ra, such as cardiovascular risk reduction, glycemic control, and weight loss potential.  - Reviewed storage requirements of Trulicity when not in use, and when to administer the medication if a dose is missed.  - Advised patient that they may experience improved satiety after meals and portion sizes of meals may be reduced as doses of Trulicity increase.  - Counseled patient to avoid foods that are fatty/oily as this may precipitate the nausea/GI upset that may occur with new start Trulicity.      Assessment/Plan   Problem List Items Addressed This Visit       Diabetes mellitus, type 2 (Multi)     Patient uncontrolled with most recent A1C of 7.8% (goal <7%).  Patient currently taking Metformin 1,00 mg BID, Farxiga 10 mg once daily, and Trulicity 1.5 mg once weekly.  Since last visit patient continued the dose of Trulicity and reports no side effects.  Patient did notice a decrease in his blood sugar and is also working on diet changes.  Per PCP plan is to increase dose of Trulicity to 3 mg however patient has not started the increased dose yet.      PLAN:   - INCREASE to Trulicity 3 mg once weekly injection.  Prescription sent to [facility] to be mailed to patient   - Continue current medications (Metformin and Farxiga) unchanged    - Continue to test blood sugar once daily in the morning     Follow up: 4 weeks            Relevant Orders    Referral to Clinical Pharmacy     Bessie Escoto, PharmD    Continue all meds under the continuation of care with the referring provider and clinical pharmacy team.

## 2025-01-14 NOTE — ASSESSMENT & PLAN NOTE
Patient uncontrolled with most recent A1C of 7.8% (goal <7%).  Patient currently taking Metformin 1,00 mg BID, Farxiga 10 mg once daily, and Trulicity 1.5 mg once weekly.  Since last visit patient continued the dose of Trulicity and reports no side effects.  Patient did notice a decrease in his blood sugar and is also working on diet changes.  Per PCP plan is to increase dose of Trulicity to 3 mg however patient has not started the increased dose yet.      PLAN:   - INCREASE to Trulicity 3 mg once weekly injection.  Prescription sent to [facility] to be mailed to patient   - Continue current medications (Metformin and Farxiga) unchanged    - Continue to test blood sugar once daily in the morning     Follow up: 4 weeks

## 2025-01-15 ENCOUNTER — PHARMACY VISIT (OUTPATIENT)
Dept: PHARMACY | Facility: CLINIC | Age: 60
End: 2025-01-15
Payer: MEDICARE

## 2025-02-11 ENCOUNTER — APPOINTMENT (OUTPATIENT)
Dept: PHARMACY | Facility: HOSPITAL | Age: 60
End: 2025-02-11
Payer: COMMERCIAL

## 2025-02-11 DIAGNOSIS — E11.9 TYPE 2 DIABETES MELLITUS WITHOUT COMPLICATION, WITHOUT LONG-TERM CURRENT USE OF INSULIN (MULTI): ICD-10-CM

## 2025-02-11 PROCEDURE — RXMED WILLOW AMBULATORY MEDICATION CHARGE

## 2025-02-12 NOTE — ASSESSMENT & PLAN NOTE
Patient controlled with most recent A1C of 7% (goal <7%).  Patient currently taking Metformin 1,00 mg BID, Farxiga 10 mg once daily, and Trulicity 3 mg once weekly.  Since last visit patient increased the dose of Trulicity and reports no side effects.  Patient did notice a decrease in his blood sugar and appetite. Overall the patient has lost a total of 17 lbs.  The patient is at goal so patient will continue current regimen.      PLAN:   - Continue Trulicity 3 mg once weekly injection.  Prescription sent to [facility] to be mailed to patient   - Continue current medications (Metformin and Farxiga) unchanged    - Continue to test blood sugar once daily in the morning     Follow up: 3 months per patient request

## 2025-02-12 NOTE — PROGRESS NOTES
Subjective   Patient ID: Mikie Louie is a 59 y.o. male who presents for Diabetes.    Referring Provider: Angeles Clarke,      Diabetes  He presents for his follow-up diabetic visit. He has type 2 diabetes mellitus. His disease course has been stable. There are no hypoglycemic associated symptoms. There are no hypoglycemic complications. He is compliant with treatment all of the time. There is no change in his home blood glucose trend. An ACE inhibitor/angiotensin II receptor blocker is being taken.       Review of Systems    Objective     There were no vitals taken for this visit.     Labs  Lab Results   Component Value Date    BILITOT 0.9 06/27/2024    CALCIUM 10.0 01/09/2025    CO2 27 01/09/2025     01/09/2025    CREATININE 0.93 01/09/2025    GLUCOSE 99 01/09/2025    ALKPHOS 40 06/27/2024    K 4.1 01/09/2025    PROT 7.4 06/27/2024     01/09/2025    AST 34 06/27/2024    ALT 50 06/27/2024    BUN 26 (H) 01/09/2025    ANIONGAP 16 01/09/2025    ALBUMIN 4.3 06/27/2024    GFRMALE 70 06/05/2023     Lab Results   Component Value Date    TRIG 178 (H) 06/27/2024    CHOL 121 06/27/2024    LDLCALC 55 06/27/2024    HDL 30.4 06/27/2024     Lab Results   Component Value Date    HGBA1C 7.0 (H) 01/09/2025       Current Outpatient Medications on File Prior to Visit   Medication Sig Dispense Refill    aspirin 81 mg EC tablet once every 24 hours.      blood sugar diagnostic (OneTouch Verio test strips) strip TEST ONCE DAILY      cholecalciferol (Vitamin D-3) 125 MCG (5000 UT) capsule Take 1 capsule (125 mcg) by mouth once daily.      dapagliflozin propanediol (Farxiga) 10 mg Take 1 tablet (10 mg) by mouth once daily in the morning. Take before meals. 90 tablet 3    dulaglutide (Trulicity) 3 mg/0.5 mL injection Inject 3 mg under the skin 1 (one) time per week. 2 mL 11    famotidine (Pepcid) 20 mg tablet TAKE 1 TABLET ONCE DAILY 90 tablet 3    fluticasone (Flonase) 50 mcg/actuation nasal spray Administer 2 sprays  into each nostril once daily. 16 g 3    lancets misc One touch ultra soft lances      lisinopril 5 mg tablet TAKE 1 TABLET ONCE DAILY 90 tablet 3    loratadine (Claritin) 10 mg tablet Take 1 tablet (10 mg) by mouth once daily. 90 tablet 3    metFORMIN (Glucophage) 500 mg tablet TAKE 2 TABLETS EVERY 12    HOURS 360 tablet 3    metoprolol tartrate (Lopressor) 25 mg tablet Take 1 tablet (25 mg) by mouth 2 times a day. 180 tablet 1    mometasone (Nasonex) 50 mcg/actuation nasal spray Administer 2 sprays into each nostril once daily.      omega-3 acid ethyl esters (Lovaza) 1 gram capsule Take 2 capsules (2 g) by mouth 2 times a day.      pravastatin (Pravachol) 40 mg tablet TAKE 1 TABLET AT BEDTIME 90 tablet 1    Vascepa 1 gram capsule TAKE 2 CAPSULES TWICE DAILYWITH MEALS 360 capsule 3     No current facility-administered medications on file prior to visit.      Trulicity Education:     - Counseled patient on Trulicity MOA, expectations, side effects, duration of therapy, administration, and monitoring parameters.  - Provided detailed dosing and administration counseling to ensure proper technique.   - Reviewed Trulicity titration schedule, starting with 0.75 mg once weekly for 4 weeks, then increase to 1.5 mg once weekly. Pt verbalized understanding.  - Counseled patient on the benefits of GLP-1ra, such as cardiovascular risk reduction, glycemic control, and weight loss potential.  - Reviewed storage requirements of Trulicity when not in use, and when to administer the medication if a dose is missed.  - Advised patient that they may experience improved satiety after meals and portion sizes of meals may be reduced as doses of Trulicity increase.  - Counseled patient to avoid foods that are fatty/oily as this may precipitate the nausea/GI upset that may occur with new start Trulicity.     Assessment/Plan   Problem List Items Addressed This Visit       Diabetes mellitus, type 2 (Multi)     Patient controlled with most  recent A1C of 7% (goal <7%).  Patient currently taking Metformin 1,00 mg BID, Farxiga 10 mg once daily, and Trulicity 3 mg once weekly.  Since last visit patient increased the dose of Trulicity and reports no side effects.  Patient did notice a decrease in his blood sugar and appetite. Overall the patient has lost a total of 17 lbs.  The patient is at goal so patient will continue current regimen.      PLAN:   - Continue Trulicity 3 mg once weekly injection.  [facility]   - Continue current medications (Metformin and Farxiga) unchanged    - Continue to test blood sugar once daily in the morning     Follow up: 3 months per patient request            Relevant Orders    Referral to Clinical Pharmacy     Roxanne MonrealD    Continue all meds under the continuation of care with the referring provider and clinical pharmacy team.

## 2025-02-13 ENCOUNTER — PHARMACY VISIT (OUTPATIENT)
Dept: PHARMACY | Facility: CLINIC | Age: 60
End: 2025-02-13
Payer: MEDICARE

## 2025-02-21 DIAGNOSIS — E78.2 MIXED HYPERLIPIDEMIA: ICD-10-CM

## 2025-02-21 RX ORDER — PRAVASTATIN SODIUM 40 MG/1
40 TABLET ORAL NIGHTLY
Qty: 90 TABLET | Refills: 2 | Status: SHIPPED | OUTPATIENT
Start: 2025-02-21

## 2025-03-06 PROCEDURE — RXMED WILLOW AMBULATORY MEDICATION CHARGE

## 2025-03-11 ENCOUNTER — PHARMACY VISIT (OUTPATIENT)
Dept: PHARMACY | Facility: CLINIC | Age: 60
End: 2025-03-11
Payer: MEDICARE

## 2025-04-03 PROCEDURE — RXMED WILLOW AMBULATORY MEDICATION CHARGE

## 2025-04-04 ENCOUNTER — PHARMACY VISIT (OUTPATIENT)
Dept: PHARMACY | Facility: CLINIC | Age: 60
End: 2025-04-04
Payer: MEDICARE

## 2025-04-18 DIAGNOSIS — E11.9 TYPE 2 DIABETES MELLITUS WITHOUT COMPLICATION, WITHOUT LONG-TERM CURRENT USE OF INSULIN: ICD-10-CM

## 2025-04-18 RX ORDER — DAPAGLIFLOZIN 10 MG/1
TABLET, FILM COATED ORAL
Qty: 90 TABLET | Refills: 0 | Status: SHIPPED | OUTPATIENT
Start: 2025-04-18

## 2025-05-05 DIAGNOSIS — E11.9 TYPE 2 DIABETES MELLITUS WITHOUT COMPLICATION, WITHOUT LONG-TERM CURRENT USE OF INSULIN: ICD-10-CM

## 2025-05-05 DIAGNOSIS — I10 BENIGN ESSENTIAL HYPERTENSION: ICD-10-CM

## 2025-05-05 PROCEDURE — RXMED WILLOW AMBULATORY MEDICATION CHARGE

## 2025-05-05 RX ORDER — LISINOPRIL 5 MG/1
5 TABLET ORAL DAILY
Qty: 90 TABLET | Refills: 1 | Status: SHIPPED | OUTPATIENT
Start: 2025-05-05

## 2025-05-05 RX ORDER — METFORMIN HYDROCHLORIDE 500 MG/1
1000 TABLET ORAL EVERY 12 HOURS
Qty: 360 TABLET | Refills: 1 | Status: SHIPPED | OUTPATIENT
Start: 2025-05-05

## 2025-05-06 ENCOUNTER — APPOINTMENT (OUTPATIENT)
Dept: PHARMACY | Facility: HOSPITAL | Age: 60
End: 2025-05-06
Payer: COMMERCIAL

## 2025-05-06 DIAGNOSIS — E11.9 TYPE 2 DIABETES MELLITUS WITHOUT COMPLICATION, WITHOUT LONG-TERM CURRENT USE OF INSULIN: ICD-10-CM

## 2025-05-06 NOTE — ASSESSMENT & PLAN NOTE
Patient controlled with most recent A1C of 7% (goal <7%).  Patient currently taking Metformin 1,00 mg BID, Farxiga 10 mg once daily, and Trulicity 3 mg once weekly.  Since last visit patient increased the dose of Trulicity and reports no side effects.  Patient did notice a decrease in his blood sugar and appetite. Overall the patient has lost a total of 20 lbs.  The patient is at goal so patient will continue current regimen.      PLAN:   - Continue Trulicity 3 mg once weekly injection.  Prescription sent to [facility] to be mailed to patient   - Continue current medications (Metformin and Farxiga) unchanged    - Continue to test blood sugar once daily in the morning     Follow up: 3 months per patient request

## 2025-05-06 NOTE — PROGRESS NOTES
Subjective   Patient ID: Mikie Louie is a 60 y.o. male who presents for Diabetes.    Referring Provider: Angeles Clarke,      Diabetes  He presents for his follow-up diabetic visit. He has type 2 diabetes mellitus. His disease course has been stable. There are no hypoglycemic associated symptoms. There are no hypoglycemic complications. He is compliant with treatment all of the time. There is no change in his home blood glucose trend. An ACE inhibitor/angiotensin II receptor blocker is being taken.       Review of Systems    Objective     There were no vitals taken for this visit.     Labs  Lab Results   Component Value Date    BILITOT 0.9 06/27/2024    CALCIUM 10.0 01/09/2025    CO2 27 01/09/2025     01/09/2025    CREATININE 0.93 01/09/2025    GLUCOSE 99 01/09/2025    ALKPHOS 40 06/27/2024    K 4.1 01/09/2025    PROT 7.4 06/27/2024     01/09/2025    AST 34 06/27/2024    ALT 50 06/27/2024    BUN 26 (H) 01/09/2025    ANIONGAP 16 01/09/2025    ALBUMIN 4.3 06/27/2024    GFRMALE 70 06/05/2023     Lab Results   Component Value Date    TRIG 178 (H) 06/27/2024    CHOL 121 06/27/2024    LDLCALC 55 06/27/2024    HDL 30.4 06/27/2024     Lab Results   Component Value Date    HGBA1C 7.0 (H) 01/09/2025       Current Outpatient Medications on File Prior to Visit   Medication Sig Dispense Refill    aspirin 81 mg EC tablet once every 24 hours.      blood sugar diagnostic (OneTouch Verio test strips) strip TEST ONCE DAILY      cholecalciferol (Vitamin D-3) 125 MCG (5000 UT) capsule Take 1 capsule (125 mcg) by mouth once daily.      dulaglutide (Trulicity) 3 mg/0.5 mL injection Inject 3 mg under the skin 1 (one) time per week. 2 mL 11    famotidine (Pepcid) 20 mg tablet TAKE 1 TABLET ONCE DAILY 90 tablet 3    Farxiga 10 mg tablet TAKE ONE TABLET BY MOUTH IN THE MORNING. TAKE BEFORE MEALS 90 tablet 0    fluticasone (Flonase) 50 mcg/actuation nasal spray Administer 2 sprays into each nostril once daily. 16 g 3     lancets misc One touch ultra soft lances      lisinopril 5 mg tablet Take 1 tablet (5 mg) by mouth once daily. 90 tablet 1    loratadine (Claritin) 10 mg tablet Take 1 tablet (10 mg) by mouth once daily. 90 tablet 3    metFORMIN (Glucophage) 500 mg tablet Take 2 tablets (1,000 mg) by mouth every 12 hours. 360 tablet 1    metoprolol tartrate (Lopressor) 25 mg tablet Take 1 tablet (25 mg) by mouth 2 times a day. 180 tablet 1    mometasone (Nasonex) 50 mcg/actuation nasal spray Administer 2 sprays into each nostril once daily.      omega-3 acid ethyl esters (Lovaza) 1 gram capsule Take 2 capsules (2 g) by mouth 2 times a day.      pravastatin (Pravachol) 40 mg tablet Take 1 tablet (40 mg) by mouth once daily at bedtime. 90 tablet 2    Vascepa 1 gram capsule TAKE 2 CAPSULES TWICE DAILYWITH MEALS 360 capsule 3    [DISCONTINUED] lisinopril 5 mg tablet TAKE 1 TABLET ONCE DAILY 90 tablet 3    [DISCONTINUED] metFORMIN (Glucophage) 500 mg tablet TAKE 2 TABLETS EVERY 12    HOURS 360 tablet 3     No current facility-administered medications on file prior to visit.      Trulicity Education:     - Counseled patient on Trulicity MOA, expectations, side effects, duration of therapy, administration, and monitoring parameters.  - Provided detailed dosing and administration counseling to ensure proper technique.   - Reviewed Trulicity titration schedule, starting with 0.75 mg once weekly for 4 weeks, then increase to 1.5 mg once weekly. Pt verbalized understanding.  - Counseled patient on the benefits of GLP-1ra, such as cardiovascular risk reduction, glycemic control, and weight loss potential.  - Reviewed storage requirements of Trulicity when not in use, and when to administer the medication if a dose is missed.  - Advised patient that they may experience improved satiety after meals and portion sizes of meals may be reduced as doses of Trulicity increase.  - Counseled patient to avoid foods that are fatty/oily as this may  precipitate the nausea/GI upset that may occur with new start Trulicity.     Assessment/Plan   Problem List Items Addressed This Visit       Diabetes mellitus, type 2 (Multi)    Patient controlled with most recent A1C of 7% (goal <7%).  Patient currently taking Metformin 1,00 mg BID, Farxiga 10 mg once daily, and Trulicity 3 mg once weekly.  Since last visit patient increased the dose of Trulicity and reports no side effects.  Patient did notice a decrease in his blood sugar and appetite. Overall the patient has lost a total of 20 lbs.  The patient is at goal so patient will continue current regimen.      PLAN:   - Continue Trulicity 3 mg once weekly injection.  [facility] patient   - Continue current medications (Metformin and Farxiga) unchanged    - Continue to test blood sugar once daily in the morning     Follow up: 3 months per patient request            Relevant Orders    Referral to Clinical Pharmacy     Bessie Escoto, PharmD    Continue all meds under the continuation of care with the referring provider and clinical pharmacy team.

## 2025-05-07 ENCOUNTER — PHARMACY VISIT (OUTPATIENT)
Dept: PHARMACY | Facility: CLINIC | Age: 60
End: 2025-05-07
Payer: MEDICARE

## 2025-06-03 PROCEDURE — RXMED WILLOW AMBULATORY MEDICATION CHARGE

## 2025-06-05 ENCOUNTER — PHARMACY VISIT (OUTPATIENT)
Dept: PHARMACY | Facility: CLINIC | Age: 60
End: 2025-06-05
Payer: MEDICARE

## 2025-06-19 DIAGNOSIS — K21.9 GASTROESOPHAGEAL REFLUX DISEASE, UNSPECIFIED WHETHER ESOPHAGITIS PRESENT: ICD-10-CM

## 2025-06-19 RX ORDER — FAMOTIDINE 20 MG/1
20 TABLET, FILM COATED ORAL DAILY
Qty: 90 TABLET | Refills: 3 | Status: SHIPPED | OUTPATIENT
Start: 2025-06-19

## 2025-06-26 PROCEDURE — RXMED WILLOW AMBULATORY MEDICATION CHARGE

## 2025-06-27 ENCOUNTER — PHARMACY VISIT (OUTPATIENT)
Dept: PHARMACY | Facility: CLINIC | Age: 60
End: 2025-06-27
Payer: MEDICARE

## 2025-07-19 DIAGNOSIS — E11.9 TYPE 2 DIABETES MELLITUS WITHOUT COMPLICATION, WITHOUT LONG-TERM CURRENT USE OF INSULIN: ICD-10-CM

## 2025-07-21 RX ORDER — DAPAGLIFLOZIN 10 MG/1
10 TABLET, FILM COATED ORAL
Qty: 90 TABLET | Refills: 0 | Status: SHIPPED | OUTPATIENT
Start: 2025-07-21

## 2025-07-24 PROCEDURE — RXMED WILLOW AMBULATORY MEDICATION CHARGE

## 2025-07-26 ENCOUNTER — PHARMACY VISIT (OUTPATIENT)
Dept: PHARMACY | Facility: CLINIC | Age: 60
End: 2025-07-26
Payer: MEDICARE

## 2025-07-30 ENCOUNTER — APPOINTMENT (OUTPATIENT)
Dept: PRIMARY CARE | Facility: CLINIC | Age: 60
End: 2025-07-30
Payer: COMMERCIAL

## 2025-08-12 ENCOUNTER — APPOINTMENT (OUTPATIENT)
Dept: PHARMACY | Facility: HOSPITAL | Age: 60
End: 2025-08-12
Payer: COMMERCIAL

## 2025-08-21 ENCOUNTER — PHARMACY VISIT (OUTPATIENT)
Dept: PHARMACY | Facility: CLINIC | Age: 60
End: 2025-08-21
Payer: MEDICARE

## 2025-08-21 PROCEDURE — RXMED WILLOW AMBULATORY MEDICATION CHARGE
